# Patient Record
Sex: FEMALE | Race: WHITE | Employment: UNEMPLOYED | URBAN - METROPOLITAN AREA
[De-identification: names, ages, dates, MRNs, and addresses within clinical notes are randomized per-mention and may not be internally consistent; named-entity substitution may affect disease eponyms.]

---

## 2017-02-23 ENCOUNTER — OFFICE VISIT (OUTPATIENT)
Dept: FAMILY MEDICINE CLINIC | Age: 52
End: 2017-02-23

## 2017-02-23 VITALS
WEIGHT: 152 LBS | OXYGEN SATURATION: 98 % | HEART RATE: 71 BPM | HEIGHT: 66 IN | SYSTOLIC BLOOD PRESSURE: 122 MMHG | BODY MASS INDEX: 24.43 KG/M2 | DIASTOLIC BLOOD PRESSURE: 76 MMHG | RESPIRATION RATE: 16 BRPM | TEMPERATURE: 98.6 F

## 2017-02-23 DIAGNOSIS — F31.2 BIPOLAR AFFECTIVE DISORDER, MANIC, SEVERE, WITH PSYCHOTIC BEHAVIOR (HCC): ICD-10-CM

## 2017-02-23 DIAGNOSIS — Z00.00 WELL WOMAN EXAM (NO GYNECOLOGICAL EXAM): Primary | ICD-10-CM

## 2017-02-23 DIAGNOSIS — D72.819 LEUKOPENIA, UNSPECIFIED TYPE: ICD-10-CM

## 2017-02-23 DIAGNOSIS — Z00.00 WELL WOMAN EXAM (NO GYNECOLOGICAL EXAM): ICD-10-CM

## 2017-02-23 RX ORDER — QUETIAPINE FUMARATE 300 MG/1
600 TABLET, FILM COATED ORAL DAILY
Qty: 60 TAB | Refills: 3 | Status: SHIPPED | OUTPATIENT
Start: 2017-02-23 | End: 2017-06-19 | Stop reason: SDUPTHER

## 2017-02-23 NOTE — LETTER
2/28/2017 10:44 AM 
 
Ms. Leyla Arellano [address] [place] [address] Dear Leyla Arellano: 
 
Please find your most recent results below. Resulted Orders CBC WITH AUTOMATED DIFF Result Value Ref Range WBC 4.2 3.4 - 10.8 x10E3/uL  
 RBC 4.50 3.77 - 5.28 x10E6/uL HGB 12.7 11.1 - 15.9 g/dL HCT 38.2 34.0 - 46.6 % MCV 85 79 - 97 fL  
 MCH 28.2 26.6 - 33.0 pg  
 MCHC 33.2 31.5 - 35.7 g/dL  
 RDW 14.8 12.3 - 15.4 % PLATELET 438 827 - 058 x10E3/uL NEUTROPHILS 53 % Lymphocytes 33 % MONOCYTES 12 % EOSINOPHILS 1 % BASOPHILS 1 %  
 ABS. NEUTROPHILS 2.2 1.4 - 7.0 x10E3/uL Abs Lymphocytes 1.4 0.7 - 3.1 x10E3/uL  
 ABS. MONOCYTES 0.5 0.1 - 0.9 x10E3/uL  
 ABS. EOSINOPHILS 0.1 0.0 - 0.4 x10E3/uL  
 ABS. BASOPHILS 0.0 0.0 - 0.2 x10E3/uL IMMATURE GRANULOCYTES 0 %  
 ABS. IMM. GRANS. 0.0 0.0 - 0.1 x10E3/uL Narrative Performed at:  [address]  [id number] : Macy Chinchilla MD, Phone:  [phone] METABOLIC PANEL, COMPREHENSIVE Result Value Ref Range Glucose 90 65 - 99 mg/dL BUN 12 6 - 24 mg/dL Creatinine 0.79 0.57 - 1.00 mg/dL GFR est non-AA 86 >59 mL/min/1.73 GFR est AA 99 >59 mL/min/1.73  
 BUN/Creatinine ratio 15 9 - 23 Sodium 141 134 - 144 mmol/L Potassium 4.8 3.5 - 5.2 mmol/L Chloride 103 96 - 106 mmol/L  
 CO2 25 18 - 29 mmol/L Calcium 9.2 8.7 - 10.2 mg/dL Protein, total 7.0 6.0 - 8.5 g/dL Albumin 4.6 3.5 - 5.5 g/dL GLOBULIN, TOTAL 2.4 1.5 - 4.5 g/dL A-G Ratio 1.9 1.1 - 2.5 Comment: **Effective March 13, 2017 the reference interval** 
  for A/G Ratio will be changing to: Age                Male          Female 0 -  7 days       1.1 - 2.3       1.1 - 2.3 
          8 - 30 days       1.2 - 2.8       1.2 - 2.8 
          1 -  6 months     1.3 - 3.6       1.3 - 3.6 
   7 months -  5 years      1.5 - 2.6       1.5 - 2.6 > 5 years      1.2 - 2.2       1.2 - 2.2 Bilirubin, total <0.2 0.0 - 1.2 mg/dL Alk. phosphatase 63 39 - 117 IU/L  
 AST (SGOT) 15 0 - 40 IU/L  
 ALT (SGPT) 15 0 - 32 IU/L Narrative Performed at:  [address]  [id number] : Ping De Oliveira MD, Phone:  [phone] LIPID PANEL Result Value Ref Range Cholesterol, total 196 100 - 199 mg/dL Triglyceride 81 0 - 149 mg/dL HDL Cholesterol 75 >39 mg/dL VLDL, calculated 16 5 - 40 mg/dL LDL, calculated 105 (H) 0 - 99 mg/dL Narrative Performed at:  [address]  [id number] : Ping De Oliveira MD, Phone:  [phone] CVD REPORT Result Value Ref Range INTERPRETATION Note Comment:  
   Supplement report is available. Narrative Performed at:  [address] [place] [address]  [id number] : Margarita Story PhD, Phone:  [phone] TSH 3RD GENERATION Result Value Ref Range TSH 1.040 0.450 - 4.500 uIU/mL Narrative Performed at:  [address]  [id number] : Ping De Oliveira MD, Phone:  [phone] SPECIMEN STATUS REPORT Result Value Ref Range SPECIMEN STATUS REPORT COMMENT Comment:  
   Written Authorization Written Authorization Written Authorization Received. Authorization received from [address] [place] [address] 02- Logged by Debbi Amato Narrative Performed at:  [address]  [id number] : Ping De Oliveira MD, Phone:  [phone] RECOMMENDATIONS: 
None. Keep up the good work! All labs normal 
 
Please call me if you have any questions: [phone] Sincerely, Lady Raine MD

## 2017-02-23 NOTE — PROGRESS NOTES
Presents for annual exam and medication refill    Had endometriosis  Had both ovaries removed by Dr. Rufus Castellanos a while back    Uterus still intact  No menses    No breast problems  Declines to have mammogram done after discussion    Has frequent constipation -- thinks that it may be a side effect to seroquel  Discussed use of miralax instead of stimulant   Also recommended that she used colace twice daily    Requested that we refill her seroquel as she states that it has been the same dosage for years -- agreed to do this as long as no change in dosage      Monitoring Parameters for seroquel from [facility]:  Mental status; vital signs (as clinically indicated); blood pressure (baseline; then yearly); weight, height, BMI; CBC (yearly); electrolytes and liver function (annually and as clinically indicated); TSH, free T4, and thyroid clinical assessment (baseline and follow-up); fasting plasma glucose level/HbA1c (baseline; then yearly); fasting lipid panel (baseline; then yearly); abnormal involuntary movements or parkinsonian signs (baseline; after dose increase); tardive dyskinesia (every 12 months; high-risk patients every 6 months); lens examination, such as a slit-lamp exam, and experts suggest it may be reasonable to inquire yearly about visual changes and perform ocular examinations yearly in patients >40 years(ADA 2004; Bhupinder Good 2004; Neto 2004). Also has bilateral knee pain -- concerned that it may be osteoarthritis, but states that it has not limited her activities; advised that xray could be done but declined at this time      Subjective:   46 y.o. female for Well Woman Check.        Patient Active Problem List   Diagnosis Code    Bipolar affective disorder, manic, severe, with psychotic behavior ([facility] F31.2    Non-compliance with treatment Z91.19    Leukopenia D72.819    Mammogram declined Z53.20    Family history of heart disease in male family member before age 54 Z80.55     Current Outpatient Prescriptions Medication Sig Dispense Refill    HERBAL DRUGS (COLON HERBAL CLEANSER PO) Take  by mouth.  QUEtiapine (SEROQUEL) 300 mg tablet Take 2 Tabs by mouth daily. Indications: BIPOLAR DISORDER IN REMISSION 60 Tab 3     Allergies   Allergen Reactions    Pcn [Penicillins] Unknown (comments)    Penicillin G Rash        Lab Results  Component Value Date/Time   WBC 3.6 02/01/2016 08:56 AM   HGB 11.9 02/01/2016 08:56 AM   HCT 34.8 02/01/2016 08:56 AM   PLATELET 305 43/66/1951 08:56 AM   MCV 86 02/01/2016 08:56 AM       Lab Results  Component Value Date/Time   Cholesterol, total 208 02/01/2016 08:56 AM   HDL Cholesterol 71 02/01/2016 08:56 AM   LDL, calculated 123 02/01/2016 08:56 AM   Triglyceride 71 02/01/2016 08:56 AM   CHOL/HDL Ratio 2.3 02/03/2015 06:02 AM       Lab Results  Component Value Date/Time   ALT (SGPT) 47 02/02/2015 02:40 PM   AST (SGOT) 37 02/02/2015 02:40 PM   Alk. phosphatase 82 02/02/2015 02:40 PM   Bilirubin, direct 0.1 12/08/2014 06:00 AM   Bilirubin, total 0.5 02/02/2015 02:40 PM       Lab Results  Component Value Date/Time   TSH 0.92 02/02/2015 02:40 PM         ROS: Feeling generally well. No headaches, no dysphagia. No prolonged cough. No dyspnea or chest pain on exertion. No abdominal pain, change in bowel habits, black or bloody stools. No urinary tract symptoms. No new or unusual musculoskeletal symptoms. Specific concerns today: as addressed above    Objective: The patient appears well, alert, oriented x 3, in no distress. Visit Vitals    /76    Pulse 71    Temp 98.6 °F (37 °C) (Oral)    Resp 16    Ht 5' 6\" (1.676 m)    Wt 152 lb (68.9 kg)    SpO2 98%    BMI 24.53 kg/m2     ENT normal.  Neck supple. No adenopathy or thyromegaly. YOVANNY. Lungs are clear, good air entry, no wheezes, rhonchi or rales. S1 and S2 normal, no murmurs, regular rate and rhythm. Abdomen soft without tenderness, guarding, mass or organomegaly. Extremities show no edema, normal peripheral pulses. Neurological is normal, no focal findings. Assessment/Plan:   Well Woman  routine labs ordered, call if any problems    ICD-10-CM ICD-9-CM    1. Well woman exam (no gynecological exam) B96.35 R59.3 METABOLIC PANEL, COMPREHENSIVE      LIPID PANEL   2. Leukopenia, unspecified type D72.819 288.50 CBC WITH AUTOMATED DIFF   3. Bipolar affective disorder, manic, severe, with psychotic behavior ([facility] F31.2 296.44 QUEtiapine (SEROQUEL) 300 mg tablet   4.  Well woman exam (no gynecological exam) G70.93 Z61.4 METABOLIC PANEL, COMPREHENSIVE      LIPID PANEL    [V70.0]

## 2017-02-23 NOTE — PROGRESS NOTES
Chief Complaint   Patient presents with    Complete Physical     c/o constipation    Referral Request     Psychiatrist/Bipolar depression    Knee Pain     bilateral     1. Have you been to the ER, urgent care clinic since your last visit? Hospitalized since your last visit? No    2. Have you seen or consulted any other health care providers outside of the [facility] since your last visit? Include any pap smears or colon screening.  No

## 2017-02-24 LAB
ALBUMIN SERPL-MCNC: 4.6 G/DL (ref 3.5–5.5)
ALBUMIN/GLOB SERPL: 1.9 {RATIO} (ref 1.1–2.5)
ALP SERPL-CCNC: 63 IU/L (ref 39–117)
ALT SERPL-CCNC: 15 IU/L (ref 0–32)
AST SERPL-CCNC: 15 IU/L (ref 0–40)
BASOPHILS # BLD AUTO: 0 X10E3/UL (ref 0–0.2)
BASOPHILS NFR BLD AUTO: 1 %
BILIRUB SERPL-MCNC: <0.2 MG/DL (ref 0–1.2)
BUN SERPL-MCNC: 12 MG/DL (ref 6–24)
BUN/CREAT SERPL: 15 (ref 9–23)
CALCIUM SERPL-MCNC: 9.2 MG/DL (ref 8.7–10.2)
CHLORIDE SERPL-SCNC: 103 MMOL/L (ref 96–106)
CHOLEST SERPL-MCNC: 196 MG/DL (ref 100–199)
CO2 SERPL-SCNC: 25 MMOL/L (ref 18–29)
CREAT SERPL-MCNC: 0.79 MG/DL (ref 0.57–1)
EOSINOPHIL # BLD AUTO: 0.1 X10E3/UL (ref 0–0.4)
EOSINOPHIL NFR BLD AUTO: 1 %
ERYTHROCYTE [DISTWIDTH] IN BLOOD BY AUTOMATED COUNT: 14.8 % (ref 12.3–15.4)
GLOBULIN SER CALC-MCNC: 2.4 G/DL (ref 1.5–4.5)
GLUCOSE SERPL-MCNC: 90 MG/DL (ref 65–99)
HCT VFR BLD AUTO: 38.2 % (ref 34–46.6)
HDLC SERPL-MCNC: 75 MG/DL
HGB BLD-MCNC: 12.7 G/DL (ref 11.1–15.9)
IMM GRANULOCYTES # BLD: 0 X10E3/UL (ref 0–0.1)
IMM GRANULOCYTES NFR BLD: 0 %
INTERPRETATION, 910389: NORMAL
LDLC SERPL CALC-MCNC: 105 MG/DL (ref 0–99)
LYMPHOCYTES # BLD AUTO: 1.4 X10E3/UL (ref 0.7–3.1)
LYMPHOCYTES NFR BLD AUTO: 33 %
MCH RBC QN AUTO: 28.2 PG (ref 26.6–33)
MCHC RBC AUTO-ENTMCNC: 33.2 G/DL (ref 31.5–35.7)
MCV RBC AUTO: 85 FL (ref 79–97)
MONOCYTES # BLD AUTO: 0.5 X10E3/UL (ref 0.1–0.9)
MONOCYTES NFR BLD AUTO: 12 %
NEUTROPHILS # BLD AUTO: 2.2 X10E3/UL (ref 1.4–7)
NEUTROPHILS NFR BLD AUTO: 53 %
PLATELET # BLD AUTO: 270 X10E3/UL (ref 150–379)
POTASSIUM SERPL-SCNC: 4.8 MMOL/L (ref 3.5–5.2)
PROT SERPL-MCNC: 7 G/DL (ref 6–8.5)
RBC # BLD AUTO: 4.5 X10E6/UL (ref 3.77–5.28)
SODIUM SERPL-SCNC: 141 MMOL/L (ref 134–144)
SPECIMEN STATUS REPORT, ROLRST: NORMAL
TRIGL SERPL-MCNC: 81 MG/DL (ref 0–149)
TSH SERPL DL<=0.005 MIU/L-ACNC: 1.04 UIU/ML (ref 0.45–4.5)
VLDLC SERPL CALC-MCNC: 16 MG/DL (ref 5–40)
WBC # BLD AUTO: 4.2 X10E3/UL (ref 3.4–10.8)

## 2017-06-19 DIAGNOSIS — F31.2 BIPOLAR AFFECTIVE DISORDER, MANIC, SEVERE, WITH PSYCHOTIC BEHAVIOR (HCC): ICD-10-CM

## 2017-06-22 RX ORDER — QUETIAPINE FUMARATE 300 MG/1
TABLET, FILM COATED ORAL
Qty: 60 TAB | Refills: 3 | Status: SHIPPED | OUTPATIENT
Start: 2017-06-22 | End: 2017-08-21 | Stop reason: SDUPTHER

## 2017-08-21 DIAGNOSIS — F31.2 BIPOLAR AFFECTIVE DISORDER, MANIC, SEVERE, WITH PSYCHOTIC BEHAVIOR (HCC): ICD-10-CM

## 2017-08-22 RX ORDER — QUETIAPINE FUMARATE 300 MG/1
TABLET, FILM COATED ORAL
Qty: 60 TAB | Refills: 3 | Status: SHIPPED | OUTPATIENT
Start: 2017-08-22 | End: 2018-02-08 | Stop reason: SDUPTHER

## 2017-08-24 ENCOUNTER — TELEPHONE (OUTPATIENT)
Dept: FAMILY MEDICINE CLINIC | Age: 52
End: 2017-08-24

## 2017-08-24 NOTE — TELEPHONE ENCOUNTER
Letter rec'd from [facility] to inform the physician that the patient has gaps in care.

## 2018-03-09 DIAGNOSIS — F31.2 BIPOLAR AFFECTIVE DISORDER, MANIC, SEVERE, WITH PSYCHOTIC BEHAVIOR (HCC): ICD-10-CM

## 2018-03-09 RX ORDER — QUETIAPINE FUMARATE 300 MG/1
TABLET, FILM COATED ORAL
Qty: 60 TAB | Refills: 0 | OUTPATIENT
Start: 2018-03-09

## 2019-12-09 ENCOUNTER — HOSPITAL ENCOUNTER (OUTPATIENT)
Dept: LAB | Age: 54
Discharge: HOME | End: 2019-12-09

## 2019-12-09 ENCOUNTER — OFFICE VISIT (OUTPATIENT)
Dept: FAMILY MEDICINE CLINIC | Age: 54
End: 2019-12-09

## 2019-12-09 VITALS
TEMPERATURE: 98.1 F | RESPIRATION RATE: 18 BRPM | OXYGEN SATURATION: 100 % | BODY MASS INDEX: 24.59 KG/M2 | HEART RATE: 60 BPM | SYSTOLIC BLOOD PRESSURE: 122 MMHG | WEIGHT: 153 LBS | DIASTOLIC BLOOD PRESSURE: 79 MMHG | HEIGHT: 66 IN

## 2019-12-09 DIAGNOSIS — E78.41 ELEVATED LIPOPROTEIN(A): ICD-10-CM

## 2019-12-09 DIAGNOSIS — Z82.49 FAMILY HISTORY OF HEART DISEASE IN MALE FAMILY MEMBER BEFORE AGE 55: ICD-10-CM

## 2019-12-09 DIAGNOSIS — F31.2 BIPOLAR AFFECTIVE DISORDER, MANIC, SEVERE, WITH PSYCHOTIC BEHAVIOR (HCC): ICD-10-CM

## 2019-12-09 DIAGNOSIS — E78.41 ELEVATED LIPOPROTEIN(A): Primary | ICD-10-CM

## 2019-12-09 LAB
CHOLEST SERPL-MCNC: 214 MG/DL
HDLC SERPL-MCNC: 78 MG/DL
HDLC SERPL: 2.7 {RATIO} (ref 0–5)
LDLC SERPL CALC-MCNC: 120 MG/DL (ref 0–100)
LIPID PROFILE,FLP: ABNORMAL
TRIGL SERPL-MCNC: 80 MG/DL (ref ?–150)
VLDLC SERPL CALC-MCNC: 16 MG/DL

## 2019-12-09 RX ORDER — LORAZEPAM 1 MG/1
1 TABLET ORAL AS NEEDED
COMMUNITY
Start: 2018-04-16

## 2019-12-09 NOTE — PATIENT INSTRUCTIONS

## 2019-12-09 NOTE — PROGRESS NOTES
Identified pt with two pt identifiers(name and ). Reviewed record in preparation for visit and have obtained necessary documentation. Chief Complaint   Patient presents with    Follow Up Chronic Condition        Health Maintenance Due   Topic    DTaP/Tdap/Td series (1 - Tdap)    Shingrix Vaccine Age 50> (1 of 2)    BREAST CANCER SCRN MAMMOGRAM     Influenza Age 5 to Adult     MEDICARE YEARLY EXAM        Visit Vitals  /79 (BP 1 Location: Right arm, BP Patient Position: Sitting)   Pulse 60   Temp 98.1 °F (36.7 °C) (Oral)   Resp 18   Ht 5' 6\" (1.676 m)   Wt 153 lb (69.4 kg)   SpO2 100%   BMI 24.69 kg/m²         Coordination of Care Questionnaire:  :   1) Have you been to an emergency room, urgent care, or hospitalized since your last visit? If yes, where when, and reason for visit? yes When: 19 Where: [facility] Reason: physical      2. Have seen or consulted any other health care provider since your last visit? If yes, where when, and reason for visit? Yes, When: 3/20/18 Where: [facility] Reason: Psych concerns      Patient states last Pap 2019 completed at [facility], patient declined influenza vaccine, Patient states she had a colonoscopy at age 46. Patient states she received Tdap about 2 years ago. 3) Do you have an Advanced Directive/ Living Will in place? NO  If no, would you like information NO    Patient is accompanied by self I have received verbal consent from April Pelletier to discuss any/all medical information while they are present in the room.

## 2019-12-09 NOTE — PROGRESS NOTES
History of Present Illness:     Chief Complaint   Patient presents with    Follow Up Chronic Condition     Pt is a 62y.o. year old female    Presents to clinic for re-establish care. [facility] at [facility]  Labs notable for LDL cholesterol 149    Bipolar disorder  Managed on Seroquel and Ativan. Followed by Psych; Dr. Talia Henderson ([address] Also followed by psychologist      Followed by GYN for mammogram and pap. UTD on her pap and mammo; normal per patient  [address]  Early heart disease in father (MI at 46), brother (MI at 62)  Trying to eat more plant based diet  Taking Fish Oil      Past Medical History:   Diagnosis Date    Aggressive outburst     Anxiety disorder     Artificial menopause     B[facility] ([facility] 2007    Endometriosis     Ill-defined condition     Leukopenia     Psychotic disorder ([facility]     Substance abuse ([facility]          Current Outpatient Medications on File Prior to Visit   Medication Sig Dispense Refill    LORazepam (ATIVAN) 1 mg tablet Take 1 mg by mouth as needed.  QUEtiapine (SEROQUEL) 300 mg tablet TAKE 2 TABLETS BY MOUTH DAILY (Patient taking differently: 100 mg daily.) 60 Tab 0    HERBAL DRUGS (COLON HERBAL CLEANSER PO) Take  by mouth. No current facility-administered medications on file prior to visit. Allergies:   Allergies   Allergen Reactions    Pcn [Penicillins] Unknown (comments)    Penicillin G Rash     Family History   Problem Relation Age of Onset    Other Mother         clotting disorder    Heart Attack Father     Hypertension Father     Hypertension Sister     High Cholesterol Brother     Cancer Maternal Grandmother         lung    Alzheimer Paternal Grandmother     Cancer Paternal Grandfather         lung     Social History     Socioeconomic History    Marital status:      Spouse name: Not on file    Number of children: Not on file    Years of education: Not on file    Highest education level: Not on file   Occupational History    Not on file   Social Needs    Financial resource strain: Not on file    Food insecurity:     Worry: Not on file     Inability: Not on file    Transportation needs:     Medical: Not on file     Non-medical: Not on file   Tobacco Use    Smoking status: Never Smoker    Smokeless tobacco: Never Used   Substance and Sexual Activity    Alcohol use: Yes     Comment: social    Drug use: No    Sexual activity: Yes     Partners: Female     Birth control/protection: Surgical   Lifestyle    Physical activity:     Days per week: Not on file     Minutes per session: Not on file    Stress: Not on file   Relationships    Social connections:     Talks on phone: Not on file     Gets together: Not on file     Attends Latter day service: Not on file     Active member of club or organization: Not on file     Attends meetings of clubs or organizations: Not on file     Relationship status: Not on file    Intimate partner violence:     Fear of current or ex partner: Not on file     Emotionally abused: Not on file     Physically abused: Not on file     Forced sexual activity: Not on file   Other Topics Concern    Not on file   Social History Narrative    Not on file         Review of Systems:  Denies fever, chills, sweats  Denies chest pain, LEE, palpitations, LE edema  Denies cough, sputum production, SOB, pleuritic chest pain, wheezing      Objective:     Vitals:    12/09/19 0803   BP: 122/79   Pulse: 60   Resp: 18   Temp: 98.1 °F (36.7 °C)   TempSrc: Oral   SpO2: 100%   Weight: 153 lb (69.4 kg)   Height: 5' 6\" (1.676 m)       Physical Exam:  General appearance - alert, well appearing, and in no distress  Neck - supple, no significant adenopathy  Chest - clear to auscultation, no wheezes, rales or rhonchi, symmetric air entry  Heart - normal rate, regular rhythm, normal S1, S2, no murmurs, rubs, clicks or gallops  Neurological - alert, oriented, normal speech, no focal findings or movement disorder noted  Extremities - no pedal edema, no clubbing or cyanosis      Recent Labs:  No results found for this or any previous visit (from the past 12 hour(s)). Assessment and Plan:   Pt is a 62y.o. year old female,      ICD-10-CM ICD-9-CM    1. Elevated lipoprotein(a) E78.41 272.8 LIPID PANEL   2. Bipolar affective disorder, manic, severe, with psychotic behavior ([facility] F31.2 296.44    3. Family history of heart disease in male family member before age 54 Z80.52 V15.46      Reviewed current problems  Re-check lipid panel today    Bipolar d/o stable  Followed by Psych    HLD  Recheck lipid panel today  Pt recently changed to plant based diet    Follow up in 6 months for cholesterol check    Selene Reyes MD      I have discussed the diagnosis with the patient and the intended plan as seen in the above orders. The patient has received an after-visit summary and questions were answered concerning future plans.

## 2019-12-10 NOTE — PROGRESS NOTES
(down from 149 on prior check)  Low ASCVD risk    Please call and notify patient that her LDL is trending down   on yesterday's check

## 2019-12-11 ENCOUNTER — TELEPHONE (OUTPATIENT)
Dept: FAMILY MEDICINE CLINIC | Age: 54
End: 2019-12-11

## 2019-12-11 NOTE — TELEPHONE ENCOUNTER
Patient notify about  trending down and no further actions required at this time.     ----- Message from Alia Medeiros MD sent at 12/10/2019 11:05 AM EST -----   (down from 149 on prior check)  Low ASCVD risk    Please call and notify patient that her LDL is trending down   on yesterday's check

## 2020-09-25 NOTE — TELEPHONE ENCOUNTER
Patient is requesting a refill on  Requested Prescriptions     Pending Prescriptions Disp Refills    QUEtiapine (SEROQUEL) 300 mg tablet 60 Tab 3     Thank you Results send via my [employer].

## 2021-01-18 ENCOUNTER — TELEPHONE (OUTPATIENT)
Dept: FAMILY MEDICINE CLINIC | Age: 56
End: 2021-01-18

## 2021-01-19 ENCOUNTER — OFFICE VISIT (OUTPATIENT)
Dept: URGENT CARE | Age: 56
End: 2021-01-19
Payer: COMMERCIAL

## 2021-01-19 VITALS — TEMPERATURE: 98.9 F | OXYGEN SATURATION: 99 % | HEART RATE: 86 BPM | RESPIRATION RATE: 14 BRPM

## 2021-01-19 DIAGNOSIS — Z20.822 ENCOUNTER FOR SCREENING LABORATORY TESTING FOR COVID-19 VIRUS IN ASYMPTOMATIC PATIENT: Primary | ICD-10-CM

## 2021-01-19 PROCEDURE — 99202 OFFICE O/P NEW SF 15 MIN: CPT | Performed by: FAMILY MEDICINE

## 2021-01-19 NOTE — LETTER
January 19, 2021 Petra Sullivan [address] [facility] [address] Dear Thee Anchors: 
Thank you for requesting access to [employer]ca. Please follow the instructions below to securely access and download your online medical record. [employer]ca allows you to send messages to your doctor, view your test results, renew your prescriptions, schedule appointments, and more. How Do I Sign Up? 1. In your internet browser, go to [email or web address]. 2. Click on the First Time User? Click Here link in the Sign In box. You will see the New Member Sign Up page. 3. Enter your t[facility]ca Access Code exactly as it appears below. You will not need to use this code after youve completed the sign-up process. If you do not sign up before the expiration date, you must request a new code. tamyca Access Code: [email or web address] Expires: 3/5/2021  9:05 AM  
 
4. Enter the last four digits of your Social Security Number (xxxx) and Date of Birth (mm/dd/yyyy) as indicated and click Submit. You will be taken to the next sign-up page. 5. Create a tamyca ID. This will be your tamyca login ID and cannot be changed, so think of one that is secure and easy to remember. 6. Create a t[employer]ca password. You can change your password at any time. 7. Enter your Password Reset Question and Answer. This can be used at a later time if you forget your password. 8. Enter your e-mail address. You will receive e-mail notification when new information is available in [address] 9. Click Sign Up. You can now view and download portions of your medical record. 10. Click the Download Summary menu link to download a portable copy of your medical information. Additional Information If you have questions, please visit the Frequently Asked Questions section of the tamyca website at [email or web address] Remember, tamyca is NOT to be used for urgent needs. For medical emergencies, dial 911. Now available from your iPhone and Android! Sincerely, [employer]

## 2021-01-19 NOTE — PROGRESS NOTES
This patient was seen at [address] [place] [address] Urgent Care while in their vehicle due to COVID-19 pandemic with PPE and focused examination in order to decrease community viral transmission. The patient/guardian gave verbal consent to treat. Lionel Gillette is a 60 y.o. female who presents for COVID-19 testing. No known direct exposure to COVID-19. Recently travelled from [facility]. Denies cough, fever, SOB. The history is provided by the patient.         Past Medical History:   Diagnosis Date    Aggressive outburst     Anxiety disorder     Artificial menopause     Bipolar affective disorder ([facility] 2007    Endometriosis     Ill-defined condition     Leukopenia     Psychotic disorder ([facility]     Substance abuse ([facility]         Past Surgical History:   Procedure Laterality Date    HX ORTHOPAEDIC      HX SALPINGO-OOPHORECTOMY  2009    bilateral         Family History   Problem Relation Age of Onset    Other Mother         clotting disorder    Heart Attack Father     Hypertension Father     Hypertension Sister     High Cholesterol Brother     Cancer Maternal Grandmother         lung    Alzheimer Paternal Grandmother     Cancer Paternal Grandfather         lung        Social History     Socioeconomic History    Marital status:      Spouse name: Not on file    Number of children: Not on file    Years of education: Not on file    Highest education level: Not on file   Occupational History    Not on file   Social Needs    Financial resource strain: Not on file    Food insecurity     Worry: Not on file     Inability: Not on file    Transportation needs     Medical: Not on file     Non-medical: Not on file   Tobacco Use    Smoking status: Never Smoker    Smokeless tobacco: Never Used   Substance and Sexual Activity    Alcohol use: Yes     Comment: social    Drug use: No    Sexual activity: Yes     Partners: Female     Birth control/protection: Surgical   Lifestyle    Physical activity Days per week: Not on file     Minutes per session: Not on file    Stress: Not on file   Relationships    Social connections     Talks on phone: Not on file     Gets together: Not on file     Attends Caodaism service: Not on file     Active member of club or organization: Not on file     Attends meetings of clubs or organizations: Not on file     Relationship status: Not on file    Intimate partner violence     Fear of current or ex partner: Not on file     Emotionally abused: Not on file     Physically abused: Not on file     Forced sexual activity: Not on file   Other Topics Concern    Not on file   Social History Narrative    Not on file                ALLERGIES: Pcn [penicillins] and Penicillin g    Review of Systems   Constitutional: Negative for activity change, appetite change, chills and fever. HENT: Negative for congestion, rhinorrhea and sore throat. Respiratory: Negative for cough, shortness of breath and wheezing. Cardiovascular: Negative for chest pain. Gastrointestinal: Negative for abdominal pain, diarrhea, nausea and vomiting. Musculoskeletal: Negative for myalgias. Neurological: Negative for headaches. Vitals:    01/19/21 0929   Pulse: 86   Resp: 14   Temp: 98.9 °F (37.2 °C)   SpO2: 99%       Physical Exam  Vitals signs and nursing note reviewed. Constitutional:       General: She is not in acute distress. Appearance: She is well-developed. She is not diaphoretic. Pulmonary:      Effort: Pulmonary effort is normal.   Neurological:      Mental Status: She is alert. Psychiatric:         Behavior: Behavior normal.         Thought Content: Thought content normal.         Judgment: Judgment normal.         MDM    ICD-10-CM ICD-9-CM   1.  Encounter for screening laboratory testing for COVID-19 virus in asymptomatic patient  Z20.822 V01.79       Orders Placed This Encounter    NOVEL CORONAVIRUS (COVID-19)     Scheduling Instructions:      1) Due to current limited availability of the COVID-19 PCR test, tests will be prioritized and may not be completed.              2) Order only if the test result will change clinical management or necessary for a return to mission-critical employment decision.              3) Print and instruct patient to adhere to CDC home isolation program. (Link Above)              4) Set up or refer patient for a monitoring program.              5) Have patient sign up for and leverage MyChart (if not previously done). Order Specific Question:   Is this test for diagnosis or screening? Answer:   Screening     Order Specific Question:   Symptomatic for COVID-19 as defined by CDC? Answer:   No     Order Specific Question:   Hospitalized for COVID-19? Answer:   No     Order Specific Question:   Admitted to ICU for COVID-19? Answer:   No     Order Specific Question:   Employed in healthcare setting? Answer:   No     Order Specific Question:   Resident in a congregate (group) care setting? Answer:   No     Order Specific Question:   Pregnant? Answer:   No     Order Specific Question:   Previously tested for COVID-19? Answer:   No        Mask in public  Maintain Social distancing  Await results    If signs and symptoms become worse the pt is to go to the ER.          Procedures

## 2021-01-21 LAB — SARS-COV-2, NAA: NOT DETECTED

## 2021-10-20 ENCOUNTER — OFFICE VISIT (OUTPATIENT)
Dept: FAMILY MEDICINE CLINIC | Age: 56
End: 2021-10-20
Payer: MEDICARE

## 2021-10-20 ENCOUNTER — NURSE TRIAGE (OUTPATIENT)
Dept: OTHER | Facility: CLINIC | Age: 56
End: 2021-10-20

## 2021-10-20 VITALS
BODY MASS INDEX: 26.03 KG/M2 | HEART RATE: 65 BPM | TEMPERATURE: 98.5 F | WEIGHT: 162 LBS | HEIGHT: 66 IN | RESPIRATION RATE: 18 BRPM | SYSTOLIC BLOOD PRESSURE: 136 MMHG | DIASTOLIC BLOOD PRESSURE: 79 MMHG | OXYGEN SATURATION: 98 %

## 2021-10-20 DIAGNOSIS — M54.50 ACUTE MIDLINE LOW BACK PAIN WITHOUT SCIATICA: Primary | ICD-10-CM

## 2021-10-20 DIAGNOSIS — R35.0 URINARY FREQUENCY: ICD-10-CM

## 2021-10-20 LAB
BILIRUB UR QL STRIP: NEGATIVE
GLUCOSE UR-MCNC: NEGATIVE MG/DL
KETONES P FAST UR STRIP-MCNC: NEGATIVE MG/DL
PH UR STRIP: 6 [PH] (ref 4.6–8)
PROT UR QL STRIP: NEGATIVE
SP GR UR STRIP: 1.01 (ref 1–1.03)
UA UROBILINOGEN AMB POC: NORMAL (ref 0.2–1)
URINALYSIS CLARITY POC: CLEAR
URINALYSIS COLOR POC: YELLOW
URINE BLOOD POC: NORMAL
URINE LEUKOCYTES POC: NEGATIVE
URINE NITRITES POC: NEGATIVE

## 2021-10-20 PROCEDURE — 99213 OFFICE O/P EST LOW 20 MIN: CPT | Performed by: STUDENT IN AN ORGANIZED HEALTH CARE EDUCATION/TRAINING PROGRAM

## 2021-10-20 PROCEDURE — 81003 URINALYSIS AUTO W/O SCOPE: CPT | Performed by: STUDENT IN AN ORGANIZED HEALTH CARE EDUCATION/TRAINING PROGRAM

## 2021-10-20 RX ORDER — LINACLOTIDE 290 UG/1
CAPSULE, GELATIN COATED ORAL
COMMUNITY
Start: 2021-08-25 | End: 2022-08-31

## 2021-10-20 NOTE — PROGRESS NOTES
Chief Complaint   Patient presents with    LOW BACK PAIN     Back pain started about 3 days ago with chronic urinary frequency, patient going out of town and wants to be sure she is not getting a UTI. Subjective  Alejandro Haddad is an 60 y.o. female with a medical history of anxiety and bipolar who presents with back pain. Patient reports persistent lower back pain that began 3 days ago. It came randomly. It is located in the center of her lower back and it feels sharp. It is persistent and she has trouble sleeping. The pain is worse at night and sometimes wakes her up. She reports a 10lb weight gain. She is very active and does not think this pain is muscular in nature as it feels different. She has tried NSAIDs prn and notes that it helps. Her las colonoscopy was 8 years ago and was normal. Her last pap was 3 years ago and wnl   She admits to urinary frequency however this has been presents for many years. She also notes that she has a hx of chronic hematuria and was told it was normal. Her last BM was 6 days ago. She denies any fever, chills, nausea, vomitng, night sweats, incontinence, weakness, numbness, or dysuria. Allergies - reviewed: Allergies   Allergen Reactions    Pcn [Penicillins] Unknown (comments)    Penicillin G Rash         Medications - reviewed:   Current Outpatient Medications   Medication Sig    LORazepam (ATIVAN) 1 mg tablet Take 1 mg by mouth as needed.  QUEtiapine (SEROQUEL) 300 mg tablet TAKE 2 TABLETS BY MOUTH DAILY (Patient taking differently: 100 mg daily.)    Linzess 290 mcg cap capsule     HERBAL DRUGS (COLON HERBAL CLEANSER PO) Take  by mouth. (Patient not taking: Reported on 10/20/2021)     No current facility-administered medications for this visit.          Past Medical History - reviewed:  Past Medical History:   Diagnosis Date    Aggressive outburst     Anxiety disorder     Artificial menopause     Bipolar affective disorder ([facility] 2007    Endometriosis     Ill-defined condition     Leukopenia     Psychotic disorder (HCC)     Substance abuse (HCC)          Past Surgical History - reviewed:   Past Surgical History:   Procedure Laterality Date    HX ORTHOPAEDIC      HX SALPINGO-OOPHORECTOMY  2009    bilateral         Social History - reviewed:  Social History     Socioeconomic History    Marital status:      Spouse name: Not on file    Number of children: Not on file    Years of education: Not on file    Highest education level: Not on file   Occupational History    Not on file   Tobacco Use    Smoking status: Never Smoker    Smokeless tobacco: Never Used   Vaping Use    Vaping Use: Never used   Substance and Sexual Activity    Alcohol use: Yes     Comment: social    Drug use: No    Sexual activity: Yes     Partners: Female     Birth control/protection: Surgical   Other Topics Concern    Not on file   Social History Narrative    Not on file     Social Determinants of Health     Financial Resource Strain:     Difficulty of Paying Living Expenses:    Food Insecurity:     Worried About Running Out of Food in the Last Year:     Ran Out of Food in the Last Year:    Transportation Needs:     Lack of Transportation (Medical):      Lack of Transportation (Non-Medical):    Physical Activity:     Days of Exercise per Week:     Minutes of Exercise per Session:    Stress:     Feeling of Stress :    Social Connections:     Frequency of Communication with Friends and Family:     Frequency of Social Gatherings with Friends and Family:     Attends Christian Services:     Active Member of Clubs or Organizations:     Attends Club or Organization Meetings:     Marital Status:    Intimate Partner Violence:     Fear of Current or Ex-Partner:     Emotionally Abused:     Physically Abused:     Sexually Abused:          Family History - reviewed:  Family History   Problem Relation Age of Onset    Other Mother         clotting disorder    Heart Attack Father     Hypertension Father     Hypertension Sister     High Cholesterol Brother     Cancer Maternal Grandmother         lung    Alzheimer Paternal Grandmother     Cancer Paternal Grandfather         lung         Immunizations - reviewed:   Immunization History   Administered Date(s) Administered    COVID-19, PFIZER, MRNA, LNP-S, PF, 30MCG/0.3ML DOSE 03/24/2021, 04/14/2021    Tdap 01/01/2018           ROS  Review of Systems   Constitutional: Negative for chills, fever, malaise/fatigue and weight loss. Respiratory: Negative for cough and shortness of breath. Gastrointestinal: Negative for abdominal pain, nausea and vomiting. Genitourinary: Positive for frequency and hematuria. Negative for dysuria, flank pain and urgency. Musculoskeletal: Positive for back pain. Negative for joint pain. Neurological: Negative for sensory change, focal weakness and weakness. Physical Exam  Visit Vitals  /79 (BP 1 Location: Right arm, BP Patient Position: Sitting, BP Cuff Size: Adult)   Pulse 65   Temp 98.5 °F (36.9 °C) (Oral)   Resp 18   Ht 5' 6\" (1.676 m)   Wt 162 lb (73.5 kg)   SpO2 98%   BMI 26.15 kg/m²       Physical Exam  HENT:      Head: Normocephalic and atraumatic. Cardiovascular:      Rate and Rhythm: Normal rate. Heart sounds: No murmur heard. Pulmonary:      Effort: Pulmonary effort is normal. No respiratory distress. Breath sounds: No wheezing. Abdominal:      General: There is no distension. Palpations: Abdomen is soft. Tenderness: There is no abdominal tenderness. There is no right CVA tenderness or left CVA tenderness. Musculoskeletal:      Lumbar back: No swelling, spasms, tenderness or bony tenderness. Normal range of motion.  Negative right straight leg raise test and negative left straight leg raise test.              Assessment/Plan    60 y.o. female with a PMH of bipolar disorder and anxiety presenting with back pain.        1. Acute midline low back pain without sciatica  -Patient presents with lower back pain x 3 days. Notes the pain is worse at night. Ddx include MSK spasm, bone pathology, mets, or intraabdominal pathology  - Will order imaging to assess for lytic lesions. Patient instructed to use pain control prn for now. If no improvement will need follow up in 4 weeks. At that time will order lab work and possible CT to assess kidneys also. - XR SPINE LUMB MIN 4 V; Future    2. Urinary frequency  - POC UA notable for trace blood. Will culture to rule out UTI as cause of symptoms. Will likely need a repeat UA with micro to assess hematuria  - AMB POC URINALYSIS DIP STICK AUTO W/O MICRO  - CULTURE, URINE; Future  - CULTURE, URINE         Follow-up and Dispositions    · Return in about 4 weeks (around 11/17/2021). I have discussed the diagnosis with the patient and the intended plan as seen in the above orders. Patient verbalized understanding of the plan and agrees with the plan. The patient has received an after-visit summary and questions were answered concerning future plans. I have discussed medication side effects and warnings with the patient as well. Informed patient to return to the office if new symptoms arise.         Ely Guadalupe DO  Family Medicine Resident

## 2021-10-20 NOTE — TELEPHONE ENCOUNTER
Reason for Disposition   Side (flank) or lower back pain present    Answer Assessment - Initial Assessment Questions  1. SYMPTOM: \"What's the main symptom you're concerned about? \" (e.g., frequency, incontinence)      Lower back pain (last 3-4 days), Urinary frequency, bladder may be fallen per pt, darker urine (not cloudy)    2. ONSET: \"When did the  symptoms  start? \"      Onset was 3-4 days ago    3. PAIN: \"Is there any pain? \" If so, ask: \"How bad is it? \" (Scale: 1-10; mild, moderate, severe)      Lower back pain (does not feel like lower back strain - is a different kind of pain) Constant pain at 6/10 - Taking Tylenol and Aleve - seemed to help at first but took last night with no results    4. CAUSE: \"What do you think is causing the symptoms? \"      She thinks it is a UTI    5. OTHER SYMPTOMS: \"Do you have any other symptoms? \" (e.g., fever, flank pain, blood in urine, pain with urination)      Lower back pain, urinary freq, darker urine    6. PREGNANCY: \"Is there any chance you are pregnant? \" \"When was your last menstrual period? \"      No    Protocols used: URINARY SYMPTOMS-ADULT-[place]    . Call came in from [place] at the [facility]    See triage above: Patient believes that she may have uti. No fever. Triage indicates for the patient to be seen today by her provider or in an Urgent Care. Warm transfer to [facility] AT THE [facility] at the [address] [place] [address] for scheduling. Care advice provided:  Intake at least 8-8 oz glasses of water per day. Patient has no further questions.

## 2021-10-21 LAB
BACTERIA SPEC CULT: NORMAL
SERVICE CMNT-IMP: NORMAL

## 2021-10-22 ENCOUNTER — TELEPHONE (OUTPATIENT)
Dept: FAMILY MEDICINE CLINIC | Age: 56
End: 2021-10-22

## 2021-10-22 NOTE — TELEPHONE ENCOUNTER
Called patient to discuss ucx results per her request. Left message to call office back at earliest Raymundo Amaya,

## 2022-05-02 ENCOUNTER — TELEPHONE (OUTPATIENT)
Dept: FAMILY MEDICINE CLINIC | Age: 57
End: 2022-05-02

## 2022-05-02 DIAGNOSIS — M25.569 CHRONIC KNEE PAIN, UNSPECIFIED LATERALITY: Primary | ICD-10-CM

## 2022-05-02 DIAGNOSIS — G89.29 CHRONIC KNEE PAIN, UNSPECIFIED LATERALITY: Primary | ICD-10-CM

## 2022-05-02 NOTE — TELEPHONE ENCOUNTER
Hello, pt called wanted to request a orthopedic referral if possible for her knee issues. She gave me the orthopedic surgeon she wanted to go to:  Dr Sosa Gomez   Fax: [phone] ATTN: Modesto Hernández  Phone: [phone]  Please advice, if she needs an appointment with us first or we can place a referral for her without seeing.  Thank you      -Darrell Lamb

## 2022-05-04 NOTE — TELEPHONE ENCOUNTER
Patient called and said that doctor office did not receive the referral that was palced. I printed the referral and faxed it to the number that was provided to me. [phone]. I also called Lexy Dewey at the doctor office to verify that she received the referral but was unable to reach her. I did call Karl Alanna back to let her know I received the confirmation and that the doctor office should have the referral now.

## 2022-05-24 ENCOUNTER — OFFICE VISIT (OUTPATIENT)
Dept: ORTHOPEDIC SURGERY | Age: 57
End: 2022-05-24
Payer: MEDICARE

## 2022-05-24 VITALS — HEIGHT: 66 IN | WEIGHT: 162 LBS | BODY MASS INDEX: 26.03 KG/M2

## 2022-05-24 DIAGNOSIS — M25.562 CHRONIC PAIN OF BOTH KNEES: Primary | ICD-10-CM

## 2022-05-24 DIAGNOSIS — M17.11 ARTHRITIS OF KNEE, RIGHT: ICD-10-CM

## 2022-05-24 DIAGNOSIS — M17.12 ARTHRITIS OF LEFT KNEE: ICD-10-CM

## 2022-05-24 DIAGNOSIS — M25.561 CHRONIC PAIN OF BOTH KNEES: Primary | ICD-10-CM

## 2022-05-24 DIAGNOSIS — G89.29 CHRONIC PAIN OF BOTH KNEES: Primary | ICD-10-CM

## 2022-05-24 PROCEDURE — 3017F COLORECTAL CA SCREEN DOC REV: CPT | Performed by: ORTHOPAEDIC SURGERY

## 2022-05-24 PROCEDURE — G9717 DOC PT DX DEP/BP F/U NT REQ: HCPCS | Performed by: ORTHOPAEDIC SURGERY

## 2022-05-24 PROCEDURE — G8419 CALC BMI OUT NRM PARAM NOF/U: HCPCS | Performed by: ORTHOPAEDIC SURGERY

## 2022-05-24 PROCEDURE — 99204 OFFICE O/P NEW MOD 45 MIN: CPT | Performed by: ORTHOPAEDIC SURGERY

## 2022-05-24 PROCEDURE — G8427 DOCREV CUR MEDS BY ELIG CLIN: HCPCS | Performed by: ORTHOPAEDIC SURGERY

## 2022-05-24 NOTE — PROGRESS NOTES
Clarisa Gaffney (: 1965) is a 59 y.o. female patient, here for evaluation of the following chief complaint(s):  Knee Pain (bilateral knee pain)       ASSESSMENT/PLAN:  Below is the assessment and plan developed based on review of pertinent history, physical exam, labs, studies, and medications. 49-year-old female comes in today with complaints of bilateral knee pain. This been going on for many years. They swell on her regularly. She has had therapy oral medicine and injections in the past.  Her x-rays reveal severe tricompartmental knee osteoarthritis bilaterally. She wanted to discuss options. We discussed the possibility of total knee arthroplasty. We also discussed continued conservative management she wants to think about her options. I did give her a manual and our website for surgery so she can think about it. She said that she will call us and let us know if she wants to proceed with surgery. Risks and benefits of joint arthroplasty discussed at length including but not limited to bleeding, need for blood transfusion, infection, damage to surrounding structures, intra-operative fracture, blood clots, pulmonary embolism, death. The patient understands the risks of surgery. All questions answered. They elected to move forward. 1. Chronic pain of both knees  -     XR KNEES BI MIN 4 V; Future  2. Arthritis of left knee  3. Arthritis of knee, right      Encounter Diagnoses   Name Primary?  Chronic pain of both knees Yes    Arthritis of left knee     Arthritis of knee, right         No follow-ups on file. SUBJECTIVE/OBJECTIVE:  Clarisa Gaffney (: 1965) is a 59 y.o. female who presents today for the following:  Chief Complaint   Patient presents with    Knee Pain     bilateral knee pain       49-year-old female comes in today with complaints of bilateral knee pain. This been going on for many years. They swell on her regularly.   She has had therapy oral medicine and injections in the past.  She notices an occasional limp. She has significant varus deformity bilaterally. IMAGING:  XR Results (most recent):  Results from Appointment encounter on 05/24/22    XR KNEES BI MIN 4 V    Narrative  4 views both knees ordered and independently reviewed. Severe tricompartmental knee osteoarthritis bilaterally. No remaining joint space. She has subluxation of the femur on the tibia on both knees. Allergies   Allergen Reactions    Pcn [Penicillins] Unknown (comments)    Penicillin G Rash       Current Outpatient Medications   Medication Sig    Linzess 290 mcg cap capsule     LORazepam (ATIVAN) 1 mg tablet Take 1 mg by mouth as needed. No current facility-administered medications for this visit. Past Medical History:   Diagnosis Date    Aggressive outburst     Anxiety disorder     Artificial menopause     Bipolar affective disorder ([facility] [address] [address] 2007    Endometriosis     Ill-defined condition     Leukopenia     Psychotic disorder ([facility]     Substance abuse ([facility]         Past Surgical History:   Procedure Laterality Date    HX ORTHOPAEDIC      HX SALPINGO-OOPHORECTOMY  2009    bilateral       Family History   Problem Relation Age of Onset    Other Mother         clotting disorder    Heart Attack Father     Hypertension Father     Hypertension Sister     High Cholesterol Brother     Cancer Maternal Grandmother         lung    Alzheimer's Disease Paternal Grandmother     Cancer Paternal Grandfather         lung        Social History     Tobacco Use    Smoking status: Never Smoker    Smokeless tobacco: Never Used   Substance Use Topics    Alcohol use: Yes     Comment: social        All systems reviewed x 12 and were negative with the exception of None      No flowsheet data found. Vitals:  Ht 5' 6\" (1.676 m)   Wt 162 lb (73.5 kg)   BMI 26.15 kg/m²    Body mass index is 26.15 kg/m².     Physical Exam    General: NAD, well developed, well nourished, alert and oriented x 3. Cardiac: Extremities well perfused    Respiratory: Nonlabored breathing    LLE: Antalgic gait. Mild effusion noted. No previous incisions noted. ROM 0-120 degrees. Grossly stable to varus/valgus stress and anterior/posterior drawer tests. Fixed varus deformity. Tender over medial lateral joint line. Motor grossly intact. RLE: Antalgic gait. Mild effusion noted. No previous incisions noted. ROM 0-120 degrees. Grossly stable to varus/valgus stress and anterior/posterior drawer tests. Fixed varus deformity. Tender over medial lateral joint line. Motor grossly intact. Vascular: Palpable pedal pulses, equal bilaterally. Skin: Warm well perfused, cap refill < 2 sec. An electronic signature was used to authenticate this note.   -- Katy Xie MD

## 2022-07-14 DIAGNOSIS — M17.12 ARTHRITIS OF LEFT KNEE: Primary | ICD-10-CM

## 2022-08-15 ENCOUNTER — OFFICE VISIT (OUTPATIENT)
Dept: FAMILY MEDICINE CLINIC | Age: 57
End: 2022-08-15
Payer: MEDICARE

## 2022-08-15 VITALS
DIASTOLIC BLOOD PRESSURE: 76 MMHG | HEART RATE: 70 BPM | SYSTOLIC BLOOD PRESSURE: 127 MMHG | WEIGHT: 160 LBS | RESPIRATION RATE: 16 BRPM | OXYGEN SATURATION: 97 % | HEIGHT: 66 IN | BODY MASS INDEX: 25.71 KG/M2

## 2022-08-15 DIAGNOSIS — Z12.12 ENCOUNTER FOR COLORECTAL CANCER SCREENING: ICD-10-CM

## 2022-08-15 DIAGNOSIS — Z12.31 ENCOUNTER FOR SCREENING MAMMOGRAM FOR MALIGNANT NEOPLASM OF BREAST: ICD-10-CM

## 2022-08-15 DIAGNOSIS — Z12.11 ENCOUNTER FOR COLORECTAL CANCER SCREENING: ICD-10-CM

## 2022-08-15 DIAGNOSIS — Z00.00 MEDICARE ANNUAL WELLNESS VISIT, SUBSEQUENT: Primary | ICD-10-CM

## 2022-08-15 PROCEDURE — G0439 PPPS, SUBSEQ VISIT: HCPCS | Performed by: STUDENT IN AN ORGANIZED HEALTH CARE EDUCATION/TRAINING PROGRAM

## 2022-08-15 PROCEDURE — G9899 SCRN MAM PERF RSLTS DOC: HCPCS | Performed by: STUDENT IN AN ORGANIZED HEALTH CARE EDUCATION/TRAINING PROGRAM

## 2022-08-15 PROCEDURE — G9717 DOC PT DX DEP/BP F/U NT REQ: HCPCS | Performed by: STUDENT IN AN ORGANIZED HEALTH CARE EDUCATION/TRAINING PROGRAM

## 2022-08-15 PROCEDURE — G8427 DOCREV CUR MEDS BY ELIG CLIN: HCPCS | Performed by: STUDENT IN AN ORGANIZED HEALTH CARE EDUCATION/TRAINING PROGRAM

## 2022-08-15 PROCEDURE — 3017F COLORECTAL CA SCREEN DOC REV: CPT | Performed by: STUDENT IN AN ORGANIZED HEALTH CARE EDUCATION/TRAINING PROGRAM

## 2022-08-15 PROCEDURE — G8419 CALC BMI OUT NRM PARAM NOF/U: HCPCS | Performed by: STUDENT IN AN ORGANIZED HEALTH CARE EDUCATION/TRAINING PROGRAM

## 2022-08-15 RX ORDER — QUETIAPINE FUMARATE 300 MG/1
TABLET, FILM COATED ORAL
COMMUNITY
Start: 2022-07-27 | End: 2022-08-31

## 2022-08-15 NOTE — PROGRESS NOTES
Medicare Annual Wellness Visit    Souleymane Cerda is a 59 y.o. female    Chief Complaint   Patient presents with    Annual Wellness Visit       1. Have you been to the ER, urgent care clinic since your last visit? Hospitalized since your last visit? No  2. Have you seen or consulted any other health care providers outside of the [address] [place] [address] since your last visit? Include any pap smears or colon screening. No    Visit Vitals  /76 (BP 1 Location: Right arm, BP Patient Position: Sitting)   Pulse 70   Resp 16   Ht 5' 6\" (1.676 m)   Wt 160 lb (72.6 kg)   SpO2 97%   BMI 25.82 kg/m²         General Health Questions   -During the past 4 weeks:   -How would you rate your health in general? Good   -How often have you been bothered by feeling dizzy when standing up? never  -How much have you been bothered by bodily pain? moderately  -Has your physical and emotional health limited your social activities with family or friends? no    Emotional Health Questions   -Do you have a history of depression, anxiety, or emotional problems? yes  -Over the past 2 weeks, have you felt down, depressed or hopeless? no  -Over the past 2 weeks, have you felt little interest or pleasure in doing things? no    Depression Risk Factor Screening     3 most recent PHQ Screens 8/15/2022   PHQ Not Done -   Little interest or pleasure in doing things Not at all   Feeling down, depressed, irritable, or hopeless Not at all   Total Score PHQ 2 0       Health Habits   -Please describe your diet habits: \"Probably eats too much bread sugar trying to avoid processed food\"  -Do you get 5 servings of fruits or vegetables daily? no  -Do you exercise regularly?  yes and bicycle    Alcohol & Drug Abuse Risk Screen    Do you average more than 1 drink per night or more than 7 drinks a week:  No    On any one occasion in the past three months have you have had more than 3 drinks containing alcohol:  No            Activities of Daily Living    -Do you need help with eating, walking, dressing, bathing, toileting, the phone, transportation, shopping, preparing meals, housework, laundry, medications or managing money? no  -In the past four weeks, was someone available to help you if you needed and wanted help with anything? yes  -Are you confident are you that you can control and manage most of your health problems? yes  -Have you been given information to help you keep track of your medications? yes  -How often do you have trouble taking your medications as prescribed? never  Patient does total self care   Hearing: Hearing is good. Fall Risk and Home Safety   -Have you fallen 2 or more times in the past year? no  -Does your home have rugs in the hallways? no,   -Do you have grab bars in the bathrooms? yes,   -Does your home have handrails on the stairs? yes,   -Do you have adequate lighting in your home? yes  -Do you have smoke detectors and check them regularly? yes  -Do you have difficulties driving a car/vehicle? no  -Do you always fasten your seat belt when you are in a car? yes  -The home contains: handrails and grab bars  Fall Risk:  Fall Risk Assessment, last 12 mths 12/9/2019   Able to walk? Yes   Fall in past 12 months?  No       Abuse Screen:  Patient is not abused    Cognitive Screening    Has your family/caregiver stated any concerns about your memory: no      Mini Cog Score (65+): n/a    Health Maintenance Due     Health Maintenance Due   Topic Date Due    Colorectal Cancer Screening Combo  Never done    Shingrix Vaccine Age 49> (1 of 2) Never done    Breast Cancer Screen Mammogram  07/01/2020    Cervical cancer screen  02/01/2021    COVID-19 Vaccine (3 - Booster for Rbito Peter series) 09/14/2021    Medicare Yearly Exam  Never done       Patient Care Team   Patient Care Team:  Luis Antonio Julian MD as PCP - General (Family Medicine)  Luis Antonio Julian MD as PCP - [facility]  Namita Shetty MD (Obstetrics & Gynecology)    History Patient Active Problem List   Diagnosis Code    Bipolar affective disorder, manic, severe, with psychotic behavior ([facility] F31.2    Non-compliance with treatment Z91.19    Leukopenia D72.819    Family history of heart disease in male family member before age 54 Z80.55     Past Medical History:   Diagnosis Date    Aggressive outburst     Anxiety disorder     Artificial menopause     Bipolar affective disorder ([facility] [address] [facility] 2007    Endometriosis     Ill-defined condition     Leukopenia     [facility]     Substance abuse ([facility]       Past Surgical History:   Procedure Laterality Date    HX ORTHOPAEDIC      HX SALPINGO-OOPHORECTOMY  2009    bilateral     Current Outpatient Medications   Medication Sig Dispense Refill    QUEtiapine (SEROquel) 300 mg tablet TAKE 2.5 TABLETS AT BEDTIME. LORazepam (ATIVAN) 1 mg tablet Take 1 mg by mouth as needed.       Linzess 290 mcg cap capsule        Allergies   Allergen Reactions    Pcn [Penicillins] Unknown (comments)    Penicillin G Rash       Family History   Problem Relation Age of Onset    Other Mother         clotting disorder    Heart Attack Father     Hypertension Father     Hypertension Sister     High Cholesterol Brother     Cancer Maternal Grandmother         lung    Alzheimer's Disease Paternal Grandmother     Cancer Paternal Grandfather         lung     Social History     Tobacco Use    Smoking status: Never    Smokeless tobacco: Never   Substance Use Topics    Alcohol use: Yes     Comment: social         Health Maintenance Due   Topic Date Due    Colorectal Cancer Screening Combo  Never done    Shingrix Vaccine Age 50> (1 of 2) Never done    Breast Cancer Screen Mammogram  07/01/2020    Cervical cancer screen  02/01/2021    COVID-19 Vaccine (3 - Booster for Pfizer series) 09/14/2021    Medicare Yearly Exam  Never done         Frank Logan

## 2022-08-18 ENCOUNTER — PATIENT OUTREACH (OUTPATIENT)
Dept: CASE MANAGEMENT | Age: 57
End: 2022-08-18

## 2022-08-18 NOTE — PROGRESS NOTES
This is the Subsequent Medicare Annual Wellness Exam, performed 12 months or more after the Initial AWV or the last Subsequent AWV    I have reviewed the patient's medical history in detail and updated the computerized patient record. Assessment/Plan   Education and counseling provided:  Are appropriate based on today's review and evaluation    1. Medicare annual wellness visit, subsequent  -     REFERRAL TO ACP CLINICAL SPECIALIST  2. Encounter for colorectal cancer screening  -     REFERRAL FOR COLONOSCOPY  3. Encounter for screening mammogram for malignant neoplasm of breast  -     [facility] MAMMO BI SCREENING INCL CAD; Future       Depression Risk Factor Screening     3 most recent PHQ Screens 8/15/2022   PHQ Not Done -   Little interest or pleasure in doing things Not at all   Feeling down, depressed, irritable, or hopeless Not at all   Total Score PHQ 2 0       Alcohol & Drug Abuse Risk Screen    Do you average more than 1 drink per night or more than 7 drinks a week:  No    On any one occasion in the past three months have you have had more than 3 drinks containing alcohol:  No          Functional Ability and Level of Safety    Hearing: Hearing is good. Activities of Daily Living: The home contains: handrails and grab bars  Patient does total self care      Ambulation: with no difficulty     Fall Risk:  Fall Risk Assessment, last 12 mths 12/9/2019   Able to walk? Yes   Fall in past 12 months?  No      Abuse Screen:  Patient is not abused       Cognitive Screening    Has your family/caregiver stated any concerns about your memory: no     Cognitive Screening: Normal - Mini Cog Test    Health Maintenance Due     Health Maintenance Due   Topic Date Due    Colorectal Cancer Screening Combo  Never done    Shingrix Vaccine Age 49> (1 of 2) Never done    Breast Cancer Screen Mammogram  07/01/2020    Cervical cancer screen  02/01/2021    COVID-19 Vaccine (3 - Booster for Pfizer series) 09/14/2021    Medicare Yearly Exam  Never done       Patient Care Team   Patient Care Team:  Felicia Landeros MD as PCP - General (Family Medicine)  Felicia Landeros MD as PCP - [facility] Empaneled Provider  Garret Stinson MD (Obstetrics & Gynecology)    History     Patient Active Problem List   Diagnosis Code    Bipolar affective disorder, manic, severe, with psychotic behavior ([address] F31.2    Non-compliance with treatment Z91.19    Leukopenia D72.819    Family history of heart disease in male family member before age 54 Z80.55     Past Medical History:   Diagnosis Date    Aggressive outburst     Anxiety disorder     Artificial menopause     Bipolar affective disorder ([address] 2007    Endometriosis     Ill-defined condition     Leukopenia     Psychotic disorder ([address]     Substance abuse ([address]       Past Surgical History:   Procedure Laterality Date    HX ORTHOPAEDIC      HX SALPINGO-OOPHORECTOMY  2009    bilateral     Current Outpatient Medications   Medication Sig Dispense Refill    QUEtiapine (SEROquel) 300 mg tablet TAKE 2.5 TABLETS AT BEDTIME. LORazepam (ATIVAN) 1 mg tablet Take 1 mg by mouth as needed.       Linzess 290 mcg cap capsule        Allergies   Allergen Reactions    Pcn [Penicillins] Unknown (comments)    Penicillin G Rash       Family History   Problem Relation Age of Onset    Other Mother         clotting disorder    Heart Attack Father     Hypertension Father     Hypertension Sister     High Cholesterol Brother     Cancer Maternal Grandmother         lung    Alzheimer's Disease Paternal Grandmother     Cancer Paternal Grandfather         lung     Social History     Tobacco Use    Smoking status: Never    Smokeless tobacco: Never   Substance Use Topics    Alcohol use: Yes     Comment: social John Carvajal MD

## 2022-08-18 NOTE — ACP (ADVANCE CARE PLANNING)
Advance Care Planning   Ambulatory ACP Specialist Patient Outreach    Date:  8/18/2022    ACP Specialist:  Meri Godinez LPN    Outreach call to patient in follow-up to ACP Specialist referral from:    [x] PCP  [] Provider   [] Ambulatory Care Management [] Other     For:                  [x] Advance Directive Assistance              [] Complete Portable DNR order              [] Complete POST/MOST              [] Code Status Discussion             [] Discuss Goals of Care             [] Early ACP Decision-Making              [] Other (Specify)    Date Referral Received: 8/18/22    Today's Outreach:  [x] First   [] Second  [] Third       Third outreach made by: [] Phone  [] Email / mail    [] [place]     Intervention:  [x] Spoke with Patient   [] Left VM requesting return call      Outcome:   LPN spoke with the pt who wishes to move forward in having an ACP conversation with an ACP specialist. Pt is alert and oriented x4. Appointment scheduled for 8/25/22 at 9 am. ACP information has been e-mailed to the pt for review prior to the appointment     Next Step:   [x] ACP scheduled conversation  [] Outreach again in one week               [x] Email / Mail [address]  [] Email / Mail Advance Directive   [] Closing referral.  Routing closure to referring provider/staff and to ACP Specialist . [] Closure letter mailed to patient with invitation to contact ACP Specialist if / when ready.   Thank you for this referral.

## 2022-08-18 NOTE — PATIENT INSTRUCTIONS
Medicare Wellness Visit, Female     The best way to live healthy is to have a lifestyle where you eat a well-balanced diet, exercise regularly, limit alcohol use, and quit all forms of tobacco/nicotine, if applicable. Regular preventive services are another way to keep healthy. Preventive services (vaccines, screening tests, monitoring & exams) can help personalize your care plan, which helps you manage your own care. Screening tests can find health problems at the earliest stages, when they are easiest to treat. [facility] follows the current, evidence-based guidelines published by the [facility] ([facility]) when recommending preventive services for our patients. Because we follow these guidelines, sometimes recommendations change over time as research supports it. (For example, mammograms used to be recommended annually. Even though Medicare will still pay for an annual mammogram, the newer guidelines recommend a mammogram every two years for women of average risk). Of course, you and your doctor may decide to screen more often for some diseases, based on your risk and your co-morbidities (chronic disease you are already diagnosed with). Preventive services for you include:  - Medicare offers their members a free annual wellness visit, which is time for you and your primary care provider to discuss and plan for your preventive service needs. Take advantage of this benefit every year!  -All adults over the age of 72 should receive the recommended pneumonia vaccines. Current USPSTF guidelines recommend a series of two vaccines for the best pneumonia protection.   -All adults should have a flu vaccine yearly and a tetanus vaccine every 10 years.   -All adults age 48 and older should receive the shingles vaccines (series of two vaccines).       -All adults age 38-68 who are overweight should have a diabetes screening test once every three years.   -All adults born between [birth year] and 1967 should be screened once for Hepatitis C.  -Other screening tests and preventive services for persons with diabetes include: an eye exam to screen for diabetic retinopathy, a kidney function test, a foot exam, and stricter control over your cholesterol.   -Cardiovascular screening for adults with routine risk involves an electrocardiogram (ECG) at intervals determined by your doctor.   -Colorectal cancer screenings should be done for adults age 54-65 with no increased risk factors for colorectal cancer. There are a number of acceptable methods of screening for this type of cancer. Each test has its own benefits and drawbacks. Discuss with your doctor what is most appropriate for you during your annual wellness visit. The different tests include: colonoscopy (considered the best screening method), a fecal occult blood test, a fecal DNA test, and sigmoidoscopy.    -A bone mass density test is recommended when a woman turns 65 to screen for osteoporosis. This test is only recommended one time, as a screening. Some providers will use this same test as a disease monitoring tool if you already have osteoporosis. -Breast cancer screenings are recommended every other year for women of normal risk, age 54-69.  -Cervical cancer screenings for women over age 72 are only recommended with certain risk factors.      Here is a list of your current Health Maintenance items (your personalized list of preventive services) with a due date:  Health Maintenance Due   Topic Date Due    Colorectal Screening  Never done    Shingles Vaccine (1 of 2) Never done    Mammogram  07/01/2020    Cervical cancer screen  02/01/2021    COVID-19 Vaccine (3 - Booster for Pfizer series) 09/14/2021    Annual Well Visit  Never done

## 2022-08-25 ENCOUNTER — DOCUMENTATION ONLY (OUTPATIENT)
Dept: CASE MANAGEMENT | Age: 57
End: 2022-08-25

## 2022-08-25 NOTE — ACP (ADVANCE CARE PLANNING)
Advance Care Planning     Advance Care Planning Clinical Specialist  Conversation Note      Date of ACP Conversation: 08/25/22    Conversation Conducted with:  Patient with Decision Making Capacity    ACP Clinical Specialist: Viktoria Tamayo RN      Health Care Decision Maker:    Current Designated Health Care Decision Maker:     Primary Decision Maker: Clint Kim - Sister - [phone]    Secondary Decision Maker: Hernando Cain - Other Relative - [phone]    Care Preferences    Hospitalization: \"If your health worsens and it becomes clear that your chance of recovery is unlikely, what would your preference be regarding hospitalization? \"    Choice:  []  The patient wants hospitalization  [x]  The patient prefers comfort-focused treatment without hospitalization. Ventilation: \"If you were in your present state of health and suddenly became very ill and were unable to breathe on your own, what would your preference be about the use of a ventilator (breathing machine) if it were available to you? \"      If patient would desire the use of a ventilator (breathing machine), answer \"yes\", if not \"no\":yes    \"If your health worsens and it becomes clear that your chance of recovery is unlikely, what would your preference be about the use of a ventilator (breathing machine) if it were available to you? \"     Would the patient desire the use of a ventilator (breathing machine)? NO      Resuscitation  \"CPR works best to restart the heart when there is a sudden event, like a heart attack, in someone who is otherwise healthy. Unfortunately, CPR does not typically restart the heart for people who have serious health conditions or who are very sick. \"    \"In the event your heart stopped as a result of an underlying serious health condition, would you want attempts to be made to restart your heart (answer \"yes\" for attempt to resuscitate) or would you prefer a natural death (answer \"no\" for do not attempt to resuscitate)? \" yes      [x] Yes  [] No   Educated Patient / Adelina Cowart regarding differences between Advance Directives and portable DNR orders. Length of ACP Conversation in minutes:  28    Conversation Outcomes:  [x] ACP discussion completed  [] Existing advance directive reviewed with patient; no changes to patient's previously recorded wishes   [x] New Advance Directive completed   [] Portable Do Not Resuscitate prepared for Provider review and signature  [] POLST/POST/MOLST/MOST prepared for Provider review and signature      Follow-up plan:    [] Schedule follow-up conversation to continue planning  [] Referred individual to Provider for additional questions/concerns   [] Advised patient/agent/surrogate to review completed ACP document and update if needed with changes in condition, patient preferences or care setting     [x] This note routed to one or more involved healthcare providers    RN reviewed above questions with pt and her answers are indicated. An AMD was completed and sent to pt via email through "[employer]. When the document has been signed by all required signers and returned, it will be scanned to pt's chart.  Sanket Dye RN

## 2022-08-31 ENCOUNTER — HOSPITAL ENCOUNTER (OUTPATIENT)
Dept: PREADMISSION TESTING | Age: 57
Discharge: HOME | End: 2022-08-31
Payer: MEDICARE

## 2022-08-31 VITALS
SYSTOLIC BLOOD PRESSURE: 130 MMHG | WEIGHT: 156.53 LBS | HEART RATE: 77 BPM | BODY MASS INDEX: 26.72 KG/M2 | TEMPERATURE: 98.3 F | DIASTOLIC BLOOD PRESSURE: 80 MMHG | HEIGHT: 64 IN

## 2022-08-31 LAB
ABO + RH BLD: NORMAL
ANION GAP SERPL CALC-SCNC: 4 MMOL/L (ref 5–15)
APPEARANCE UR: CLEAR
BACTERIA URNS QL MICRO: ABNORMAL /HPF
BILIRUB UR QL: NEGATIVE
BLOOD GROUP ANTIBODIES SERPL: NORMAL
BUN SERPL-MCNC: 16 MG/DL (ref 6–20)
BUN/CREAT SERPL: 23 (ref 12–20)
CALCIUM SERPL-MCNC: 9.1 MG/DL (ref 8.5–10.1)
CHLORIDE SERPL-SCNC: 107 MMOL/L (ref 97–108)
CO2 SERPL-SCNC: 28 MMOL/L (ref 21–32)
COLOR UR: ABNORMAL
CREAT SERPL-MCNC: 0.71 MG/DL (ref 0.55–1.02)
EPITH CASTS URNS QL MICRO: ABNORMAL /LPF
ERYTHROCYTE [DISTWIDTH] IN BLOOD BY AUTOMATED COUNT: 12.6 % (ref 11.5–14.5)
EST. AVERAGE GLUCOSE BLD GHB EST-MCNC: 97 MG/DL
GLUCOSE SERPL-MCNC: 95 MG/DL (ref 65–100)
GLUCOSE UR STRIP.AUTO-MCNC: NEGATIVE MG/DL
HBA1C MFR BLD: 5 % (ref 4–5.6)
HCT VFR BLD AUTO: 37.3 % (ref 35–47)
HGB BLD-MCNC: 12.9 G/DL (ref 11.5–16)
HGB UR QL STRIP: NEGATIVE
INR PPP: 1 (ref 0.9–1.1)
KETONES UR QL STRIP.AUTO: NEGATIVE MG/DL
LEUKOCYTE ESTERASE UR QL STRIP.AUTO: NEGATIVE
MCH RBC QN AUTO: 31.5 PG (ref 26–34)
MCHC RBC AUTO-ENTMCNC: 34.6 G/DL (ref 30–36.5)
MCV RBC AUTO: 91 FL (ref 80–99)
NITRITE UR QL STRIP.AUTO: NEGATIVE
NRBC # BLD: 0 K/UL (ref 0–0.01)
NRBC BLD-RTO: 0 PER 100 WBC
PH UR STRIP: 5.5 [PH] (ref 5–8)
PLATELET # BLD AUTO: 301 K/UL (ref 150–400)
PMV BLD AUTO: 8.9 FL (ref 8.9–12.9)
POTASSIUM SERPL-SCNC: 3.8 MMOL/L (ref 3.5–5.1)
PROT UR STRIP-MCNC: NEGATIVE MG/DL
PROTHROMBIN TIME: 10.2 SEC (ref 9–11.1)
RBC # BLD AUTO: 4.1 M/UL (ref 3.8–5.2)
RBC #/AREA URNS HPF: ABNORMAL /HPF (ref 0–5)
SODIUM SERPL-SCNC: 139 MMOL/L (ref 136–145)
SP GR UR REFRACTOMETRY: 1.01 (ref 1–1.03)
SPECIMEN EXP DATE BLD: NORMAL
UA: UC IF INDICATED,UAUC: ABNORMAL
UROBILINOGEN UR QL STRIP.AUTO: 0.2 EU/DL (ref 0.2–1)
WBC # BLD AUTO: 4.2 K/UL (ref 3.6–11)
WBC URNS QL MICRO: ABNORMAL /HPF (ref 0–4)
YEAST BUDDING URNS QL: PRESENT

## 2022-08-31 PROCEDURE — 93005 ELECTROCARDIOGRAM TRACING: CPT

## 2022-08-31 PROCEDURE — 85027 COMPLETE CBC AUTOMATED: CPT

## 2022-08-31 PROCEDURE — 85610 PROTHROMBIN TIME: CPT

## 2022-08-31 PROCEDURE — 36415 COLL VENOUS BLD VENIPUNCTURE: CPT

## 2022-08-31 PROCEDURE — 86900 BLOOD TYPING SEROLOGIC ABO: CPT

## 2022-08-31 PROCEDURE — 81001 URINALYSIS AUTO W/SCOPE: CPT

## 2022-08-31 PROCEDURE — 83036 HEMOGLOBIN GLYCOSYLATED A1C: CPT

## 2022-08-31 PROCEDURE — 80048 BASIC METABOLIC PNL TOTAL CA: CPT

## 2022-08-31 RX ORDER — ASCORBIC ACID 500 MG
1000 TABLET ORAL DAILY
COMMUNITY

## 2022-08-31 RX ORDER — ESTRADIOL 0.1 MG/G
2 CREAM VAGINAL AS NEEDED
COMMUNITY
End: 2022-09-07

## 2022-08-31 RX ORDER — BUTYROSPERMUM PARKII(SHEA BUTTER), SIMMONDSIA CHINENSIS (JOJOBA) SEED OIL, ALOE BARBADENSIS LEAF EXTRACT .01; 1; 3.5 G/100G; G/100G; G/100G
100 LIQUID TOPICAL
COMMUNITY

## 2022-08-31 NOTE — PERIOP NOTES
[address]  ORTHOPAEDIC    Surgery Date:   9/7    Your surgeon's office or [address] [place] [address] staff will call you between 4 PM- 8 PM the day before surgery with your arrival time. If your surgery is on a Monday, you will receive a call the preceding Friday. Please report to [facility] Patient Access/Admitting on the 1st floor. Bring your insurance card, photo identification, and any copayment (if applicable). If you are going home the same day of your surgery, you must have a responsible adult to drive you home. You need to have a responsible adult to stay with you the first 24 hours after surgery and you should not drive a car for 24 hours following your surgery. Do NOT eat any solid foods after midnight the night before surgery including candy, mints or gum. You may drink clear liquids from midnight until 1 hour prior to arrival time. You may drink up to 12 ounces at one time every 4 hours. Do NOT drink alcohol or smoke 24 hours before surgery. STOP smoking for 14 days prior as it helps with breathing and healing after surgery. If your arrival time is 3pm or later, you may eat a light breakfast before 8am (toast, bagel-no butter, black coffee, plain tea, fruit juice-no pulp) Please note special instructions, if applicable, below for medications. If you are being admitted to the hospital,please leave personal belongings/luggage in your car until you have an assigned hospital room number. Please wear comfortable clothes. Wear your glasses instead of contacts. We ask that all money, jewelry and valuables be left at home. Wear no make up, particularly mascara, the day of surgery. All body piercings, rings, and jewelry need to be removed and left at home. Please remove any nail polish or artificial nails from your fingernails. Please wear your hair loose or down. Please no pony-tails, buns, or any metal hair accessories.  If you shower the morning of surgery, please do not apply any lotions or powders afterwards. You may wear deodorant. Do not shave any body area within 24 hours of your surgery. Please follow all instructions to avoid any potential surgical cancellation. Should your physical condition change, (i.e. fever, cold, flu, etc.) please notify your surgeon as soon as possible. It is important to be on time. If a situation occurs where you may be delayed, please call:  [phone] / [phone] on the day of surgery. The Preadmission Testing staff can be reached at [phone]. Special instructions: BRING COVID CARD    Current Outpatient Medications   Medication Sig    quetiapine fumarate (QUETIAPINE PO) Take 750 mg by mouth nightly. multivit-min/iron/folic/lutein (MULTIVITAMIN WOMEN 50 PLUS PO) Take 1 Tablet by mouth every morning. docosahexaenoic acid/epa (FISH OIL PO) Take 1,200 mg by mouth daily. tumeric-ging-olive-oreg-capryl 100 mg-150 mg- 50 mg-150 mg cap Take  by mouth. OTHER 1 Tablet every morning. TUMERIC 450 MG WITH GINGER 50 MG    Lacto no.76/Bifido/FOS/larch (WOMEN'S PROBIOTIC PO) Take 10 mg by mouth daily. magnesium citrate 100 mg cap Take 100 mg by mouth every morning. ubidecarenone/vitamin E mixed (COQ10  PO) Take 200 mg by mouth every morning.    herbal drugs (COLON HERBAL CLEANSER PO) Take 1,200 mg by mouth nightly as needed. estradioL (ESTRACE) 0.01 % (0.1 mg/gram) vaginal cream Insert 2 g into vagina as needed. cholecalciferol, vitamin D3, (VITAMIN D3 PO) Take 125 mcg by mouth daily. ascorbic acid, vitamin C, (Vitamin C) 500 mg tablet Take 1,000 mg by mouth daily. LORazepam (ATIVAN) 1 mg tablet Take 1 mg by mouth as needed. No current facility-administered medications for this encounter.        YOU MUST ONLY TAKE THESE MEDICATIONS THE MORNING OF SURGERY WITH A SIP OF WATER: NONE  MEDICATIONS TO TAKE THE MORNING OF SURGERY ONLY IF NEEDED: LORAZEPAM  HOLD these prescription medications BEFORE Surgery: STARTING TODAY 8/31 STOP MULTIVITAMIN, FISH OIL, TUMERIC, PROBIOTIC MAG. CITRATE, COQ10, HERBAL CLEANSE, VIT. D3, AND VIT. C   Ask your surgeon/prescribing physician about when/if to STOP taking these medications:   Stop any non-steroidal anti-inflammatory drugs (i.e. Ibuprofen, Naproxen, Advil, Aleve) 3 days before surgery. You may take Tylenol. STOP all vitamins and herbal supplements 1 week prior to  surgery. If you are currently taking Plavix, Coumadin, or any other blood-thinning/anticoagulant medication contact your prescribing physician for instructions. Preventing Infections Before and After - Your Surgery    IMPORTANT INSTRUCTIONS    You play an important role in your health and preparation for surgery. To reduce the germs on your skin you will need to shower with CHG soap (Chorhexidine gluconate 4%) two times before surgery. CHG soap (Hibiclens, Hex-A-Clens or store brand)  CHG soap will be provided at your Preadmission Testing (PAT) appointment. If you do not have a PAT appointment before surgery, you may arrange to  CHG soap from our office or purchase CHG soap at a pharmacy, grocery or department store. You need to purchase TWO 4 ounce bottles to use for your 2 showers. Steps to follow:  [place] your hair with your normal shampoo and your body with regular soap and rinse well to remove shampoo and soap from your skin. Wet a clean washcloth and turn off the shower. Put CHG soap on washcloth and apply to your entire body from the neck down. Do not use on your head, face or private parts(genitals). Do not use CHG soap on open sores, wounds or areas of skin irritation. Wash you body gently for 5 minutes. Do not wash your skin too hard. This soap does not create lather. Pay special attention to your underarms and from your belly button to your feet. Turn the shower back on and rinse well to get CHG soap off your body. Pat your skin dry with a clean, dry towel.  Do not apply lotions or moisturizer. Put on clean clothes and sleep on fresh bed sheets and do not allow pets to sleep with you. Shower with CHG soap 2 times before your surgery  The evening before your surgery  The morning of your surgery      Tips to help prevent infections after your surgery:  Protect your surgical wound from germs:  Hand washing is the most important thing you and your caregivers can do to prevent infections. Keep your bandage clean and dry! Do not touch your surgical wound. Use clean, freshly washed towels and washcloths every time you shower; do not share bath linens with others. Until your surgical wound is healed, wear clothing and sleep on bed linens each day that are clean and freshly washed. Do not allow pets to sleep in your bed with you or touch your surgical wound. Do not smoke - smoking delays wound healing. This may be a good time to stop smoking. If you have diabetes, it is important for you to manage your blood sugar levels properly before your surgery as well as after your surgery. Poorly managed blood sugar levels slow down wound healing and prevent you from healing completely. Prevention of Infection  Testing for Staphylococcus aureus on your skin before surgery    Staphylococcus aureus (staph) is a common bacteria that is found on the body. It normally does not cause infection on healthy skin. Before surgery, you will be tested to see if you have staph by swabbing the inside of your nose. When you have an incision with surgery, the goal is to protect that incision from infection. Removal of the staph bacteria before surgery can decrease the risk of a surgical site infection. If your nose swab is positive for staph you will be called. Your treatment will include 2 steps:  Prescription for Mupirocin ointment to be used in each nostril twice a day for 5 days. Showering with Chlorhexidine (CHG) liquid soap for 5 days prior to surgery.     How to use Mupirocin ointment in your nose  Pick up the prescription from your pharmacy. You will receive a large tube of ointment which will be big enough for all of your treatments. You will apply this ointment to each nostril 2 times a day for 5 days. Wash your hands with  gel or soap and water for 20 seconds before using ointment. Place a pea-sized amount of ointment on a cotton Q-tip. Apply ointment just inside of each nostril with the Q-tip. Do not push Q-tip or ointment deep inside you nose. Press your nostrils together and massage for a few seconds. Wash your hands with  gel or soap and water after you are finished. Do not get ointment near your eyes. If it gets into your eyes, rinse them with cool water. If you need to use nasal spray, clean the tip of the bottle with alcohol before use and do not use both at the same time. If you are scheduled for COVID testing during the 5 days, do NOT apply morning dose until after the COVID test has been performed. How to use Chlorhexidine (CHG) 4% liquid soap  Purchase an 8 ounce bottle of CHG liquid soap (Chlorhexidine 4%, Hibiclens, Hex-A-Clens or store brand) at a pharmacy or grocery store. Wash your hair with your normal shampoo and your body with regular soap and rinse well to remove shampoo and soap from your skin. Wet a clean washcloth and turn off the shower. Put CHG soap on washcloth and apply to your entire body from the neck down. Do not use on your head, face or private parts(genitals). Do not use CHG soap on open sores, wounds or areas of skin irritation. Wash your body gently for 5 minutes. Do not wash your skin too hard. This soap does not create lather. Pay special attention to your underarms and from your belly button to your feet. Turn the shower back on and rinse well to get CHG soap off your body. Pat your skin dry with a clean, dry towel. Do not apply lotions or moisturizer. Put on clean clothes and sleep on fresh bed sheets the night before surgery.  Do not allow pets to sleep with you. Eating and Drinking Before Surgery    You may eat a regular dinner at the usual time on the day before your surgery. Do NOT eat any solid foods after midnight unless your arrival time at the hospital is 3pm or later. You may drink clear liquids only from 12 midnight until 1 hours prior to your arrival time at the hospital on the day of your surgery. Do NOT drink alcohol. Clear liquids include:  Water  Fruit juices without pulp( i.e. apple juice)  Carbonated beverages  Black coffee (no cream/milk)  Tea (no cream/milk)  Gatorade  You may drink up to 12-16 ounces at one time every 4 hours between the hours of midnight and 1 hour before your arrival time at the hospital. Example- if your arrival time at the hospital is 6am, you may drink 12-16 ounces of clear liquids no later than 5am.  If your arrival time at the hospital is 3pm or later, you may eat a light breakfast before 8am.  A light breakfast includes: Toast or bagel (no butter)  Black coffee (no cream/milk)  Tea (no cream/milk)  Fruit juices without pulp ( i.e. apple juice)  Do NOT eat meat, eggs, vegetables or fruit  If you have any questions, please contact your surgeon's office. Patient Information Regarding COVID Restrictions    Day of Procedure    Please park in the parking deck or any designated visitor parking lot. Enter the facility through the Main Entrance of the hospital.  On the day of surgery, please provide the cell phone number of the person who will be waiting for you to the Patient Access representative at the time of registration. Please wear a mask on the day of your procedure. We are now allowing two designated visitors per stay. Pediatric patients may have 2 designated visitors. These two people may come in with you on the day of your procedure. The designated visitor must also wear a mask.   Once your procedure and the immediate recovery period is completed, a nurse in the recovery area will contact your designated visitor to inform them of your room number or to otherwise review other pertinent information regarding your care. Social distancing practices are to be adhered to in waiting areas and the cafeteria. The patient was contacted in person. She verbalized understanding of all instructions does not  need reinforcement.

## 2022-09-01 LAB
ATRIAL RATE: 60 BPM
BACTERIA SPEC CULT: NORMAL
BACTERIA SPEC CULT: NORMAL
CALCULATED P AXIS, ECG09: 60 DEGREES
CALCULATED R AXIS, ECG10: -5 DEGREES
CALCULATED T AXIS, ECG11: 36 DEGREES
DIAGNOSIS, 93000: NORMAL
P-R INTERVAL, ECG05: 180 MS
Q-T INTERVAL, ECG07: 432 MS
QRS DURATION, ECG06: 94 MS
QTC CALCULATION (BEZET), ECG08: 432 MS
SERVICE CMNT-IMP: NORMAL
VENTRICULAR RATE, ECG03: 60 BPM

## 2022-09-01 NOTE — PERIOP NOTES
PAT Nurse Practitioner   Pre-Operative Chart Review/Assessment:-ORTHOPEDIC                Patient Name:  Geo Mark                                                           Age:   59 y.o.    :  1965     Today's Date:  2022     Date of PAT:   2022      Date of Surgery:    2022      Procedure(s):  Left Total Knee Arthroplasty     Surgeon:   Dr. Vikas Baker                       PLAN:      1)  Medical Clearance:  Dr. Louise Duane      2)  Cardiac Clearance:  EKG and METs reviewed. No further cardiac evaluation requested. PAT EKG showed normal sinus rhythm. 3)  Diabetic Treatment Consult:  Not indicated. A1c-5.0      4)  Sleep Apnea evaluation:   Not indicated. PIPO Score 1.       5) Treatment for MRSA/Staph Aureus:  Neg      6) Additional Concerns:  Bipolar, anxiety                Vital Signs:         Vitals:    22 0947   BP: 130/80   Pulse: 77   Temp: 98.3 °F (36.8 °C)   Weight: 71 kg (156 lb 8.4 oz)   Height: 5' 4.25\" (1.632 m)          Body mass index is 26.66 kg/m².         ____________________________________________  PAST MEDICAL HISTORY  Past Medical History:   Diagnosis Date    Aggressive outburst     Anxiety disorder     Arthritis 2008    Knees and hands    Artificial menopause     Bipolar affective disorder ([facility] 2007    Endometriosis     Leukopenia     Psychiatric disorder [id number]    Bipolar episodes    Psychotic disorder ([facility]     Substance abuse ([facility]       ____________________________________________  PAST SURGICAL HISTORY  Past Surgical History:   Procedure Laterality Date    HX BREAST BIOPSY Right     Benign    HX GI      HX ORTHOPAEDIC  2018    REPAIR BROKEN FEMUR/HIP    HX SALPINGO-OOPHORECTOMY Bilateral       ____________________________________________  HOME MEDICATIONS  Current Outpatient Medications   Medication Sig    quetiapine fumarate (QUETIAPINE PO) Take 750 mg by mouth nightly.     multivit-min/iron/folic/lutein (MULTIVITAMIN WOMEN 50 PLUS PO) Take 1 Tablet by mouth every morning. docosahexaenoic acid/epa (FISH OIL PO) Take 1,200 mg by mouth daily. tumeric-ging-olive-oreg-capryl 100 mg-150 mg- 50 mg-150 mg cap Take  by mouth. OTHER 1 Tablet every morning. TUMERIC 450 MG WITH GINGER 50 MG    Lacto no.76/Bifido/FOS/larch (WOMEN'S PROBIOTIC PO) Take 10 mg by mouth daily. magnesium citrate 100 mg cap Take 100 mg by mouth every morning. ubidecarenone/vitamin E mixed (COQ10  PO) Take 200 mg by mouth every morning.    herbal drugs (COLON HERBAL CLEANSER PO) Take 1,200 mg by mouth nightly as needed. estradioL (ESTRACE) 0.01 % (0.1 mg/gram) vaginal cream Insert 2 g into vagina as needed. cholecalciferol, vitamin D3, (VITAMIN D3 PO) Take 125 mcg by mouth daily. ascorbic acid, vitamin C, (Vitamin C) 500 mg tablet Take 1,000 mg by mouth daily. LORazepam (ATIVAN) 1 mg tablet Take 1 mg by mouth as needed. No current facility-administered medications for this encounter.      ____________________________________________  ALLERGIES  Allergies   Allergen Reactions    Pcn [Penicillins] Unknown (comments)     NO REPORTED SEVERE NON-IGE-MEDIATED REACTIONS    Penicillin G Rash     NO REPORTED SEVERE NON-IGE-MEDIATED REACTON      ____________________________________________  SOCIAL HISTORY  Social History     Tobacco Use    Smoking status: Never    Smokeless tobacco: Never   Substance Use Topics    Alcohol use:  Yes     Alcohol/week: 4.0 standard drinks     Types: 4 Shots of liquor per week      ____________________________________________   Internal Administration   First Dose COVID-19, PFIZER PURPLE top, DILUTE for use, (age 15 y+), IM, 30mcg/0.3mL  03/24/2021   Second Dose COVID-19, PFIZER PURPLE top, DILUTE for use, (age 15 y+), IM, 30mcg/0.3mL  04/14/2021      Last COVID Lab SARS-CoV-2, DOMINIK (no units)   Date Value   01/19/2021 Not Detected                    Labs:     Hospital Outpatient Visit on 08/31/2022   Component Date Value Ref Range Status    Sodium 08/31/2022 139  136 - 145 mmol/L Final    Potassium 08/31/2022 3.8  3.5 - 5.1 mmol/L Final    Chloride 08/31/2022 107  97 - 108 mmol/L Final    CO2 08/31/2022 28  21 - 32 mmol/L Final    Anion gap 08/31/2022 4 (A) 5 - 15 mmol/L Final    Glucose 08/31/2022 95  65 - 100 mg/dL Final    BUN 08/31/2022 16  6 - 20 MG/DL Final    Creatinine 08/31/2022 0.71  0.55 - 1.02 MG/DL Final    BUN/Creatinine ratio 08/31/2022 23 (A) 12 - 20   Final    GFR est AA 08/31/2022 >60  >60 ml/min/1.73m2 Final    GFR est non-AA 08/31/2022 >60  >60 ml/min/1.73m2 Final    Estimated GFR is calculated using the IDMS-traceable Modification of Diet in Renal Disease (MDRD) Study equation, reported for both  Americans (GFRAA) and non- Americans (GFRNA), and normalized to 1.73m2 body surface area. The physician must decide which value applies to the patient. Calcium 08/31/2022 9.1  8.5 - 10.1 MG/DL Final    WBC 08/31/2022 4.2  3.6 - 11.0 K/uL Final    RBC 08/31/2022 4.10  3.80 - 5.20 M/uL Final    HGB 08/31/2022 12.9  11.5 - 16.0 g/dL Final    HCT 08/31/2022 37.3  35.0 - 47.0 % Final    MCV 08/31/2022 91.0  80.0 - 99.0 FL Final    MCH 08/31/2022 31.5  26.0 - 34.0 PG Final    MCHC 08/31/2022 34.6  30.0 - 36.5 g/dL Final    RDW 08/31/2022 12.6  11.5 - 14.5 % Final    PLATELET 77/26/2033 415  150 - 400 K/uL Final    MPV 08/31/2022 8.9  8.9 - 12.9 FL Final    NRBC 08/31/2022 0.0  0  WBC Final    ABSOLUTE NRBC 08/31/2022 0.00  0.00 - 0.01 K/uL Final    Crossmatch Expiration 08/31/2022 09/10/2022,2359   Final    ABO/Rh(D) 08/31/2022 A POSITIVE   Final    Antibody screen 08/31/2022 NEG   Final    INR 08/31/2022 1.0  0.9 - 1.1   Final    A single therapeutic range for Vit K antagonists may not be optimal for all indications - see June, 2008 issue of Chest, American College of Chest Physicians Evidence-Based Clinical Practice Guidelines, 8th Edition.     Prothrombin time 08/31/2022 10.2  9.0 - 11.1 sec Final Color 08/31/2022 YELLOW/STRAW    Final    Color Reference Range: Straw, Yellow or Dark Yellow    Appearance 08/31/2022 CLEAR  CLEAR   Final    Specific gravity 08/31/2022 1.009  1.003 - 1.030   Final    pH (UA) 08/31/2022 5.5  5.0 - 8.0   Final    Protein 08/31/2022 Negative  NEG mg/dL Final    Glucose 08/31/2022 Negative  NEG mg/dL Final    Ketone 08/31/2022 Negative  NEG mg/dL Final    Bilirubin 08/31/2022 Negative  NEG   Final    Blood 08/31/2022 Negative  NEG   Final    Urobilinogen 08/31/2022 0.2  0.2 - 1.0 EU/dL Final    Nitrites 08/31/2022 Negative  NEG   Final    Leukocyte Esterase 08/31/2022 Negative  NEG   Final    WBC 08/31/2022 0-4  0 - 4 /hpf Final    RBC 08/31/2022 0-5  0 - 5 /hpf Final    Epithelial cells 08/31/2022 FEW  FEW /lpf Final    Epithelial cell category consists of squamous cells and /or transitional urothelial cells. Renal tubular cells, if present, are separately identified as such.     Bacteria 08/31/2022 1+ (A) NEG /hpf Final    UA:UC IF INDICATED 08/31/2022 CULTURE NOT INDICATED BY UA RESULT  CNI   Final    Budding yeast 08/31/2022 PRESENT (A) NEG   Final    Ventricular Rate 08/31/2022 60  BPM Final    Atrial Rate 08/31/2022 60  BPM Final    P-R Interval 08/31/2022 180  ms Final    QRS Duration 08/31/2022 94  ms Final    Q-T Interval 08/31/2022 432  ms Final    QTC Calculation (Bezet) 08/31/2022 432  ms Final    Calculated P Axis 08/31/2022 60  degrees Final    Calculated R Axis 08/31/2022 -5  degrees Final    Calculated T Axis 08/31/2022 36  degrees Final    Diagnosis 08/31/2022    Final                    Value:Normal sinus rhythm  Normal ECG  When compared with ECG of 09-SEP-2009 15:38,  No significant change was found  Confirmed by Juli Ardon M.D., Christine Du ([id number]) on 9/1/2022 11:30:51 AM      Hemoglobin A1c 08/31/2022 5.0  4.0 - 5.6 % Final    Comment: NEW METHOD  PLEASE NOTE NEW REFERENCE RANGE  (NOTE)  HbA1C Interpretive Ranges  <5.7              Normal  5.7 - 6.4         Consider Prediabetes  >6.5              Consider Diabetes      Est. average glucose 08/31/2022 97  mg/dL Final    Special Requests: 08/31/2022 NO SPECIAL REQUESTS    Final    Culture result: 08/31/2022 MRSA NOT PRESENT    Final    Culture result: 08/31/2022     Final                    Value:Screening of patient nares for MRSA is for surveillance purposes and, if positive, to facilitate isolation considerations in high risk settings. It is not intended for automatic decolonization interventions per se as regimens are not sufficiently effective to warrant routine use. Skin:     Denies open wounds, cuts, sores, rashes or other areas of concern in PAT assessment.           Terry Goldstein, NP

## 2022-09-06 ENCOUNTER — ANESTHESIA EVENT (OUTPATIENT)
Dept: SURGERY | Age: 57
End: 2022-09-06
Payer: MEDICARE

## 2022-09-07 ENCOUNTER — HOME HEALTH ADMISSION (OUTPATIENT)
Dept: HOME HEALTH SERVICES | Facility: HOME HEALTH | Age: 57
End: 2022-09-07
Payer: MEDICARE

## 2022-09-07 ENCOUNTER — HOSPITAL ENCOUNTER (OUTPATIENT)
Age: 57
Discharge: HOME HEALTH | End: 2022-09-07
Attending: ORTHOPAEDIC SURGERY | Admitting: ORTHOPAEDIC SURGERY
Payer: MEDICARE

## 2022-09-07 ENCOUNTER — ANESTHESIA (OUTPATIENT)
Dept: SURGERY | Age: 57
End: 2022-09-07
Payer: MEDICARE

## 2022-09-07 VITALS
OXYGEN SATURATION: 97 % | WEIGHT: 156.53 LBS | RESPIRATION RATE: 16 BRPM | SYSTOLIC BLOOD PRESSURE: 133 MMHG | TEMPERATURE: 97.5 F | BODY MASS INDEX: 26.72 KG/M2 | HEIGHT: 64 IN | HEART RATE: 75 BPM | DIASTOLIC BLOOD PRESSURE: 79 MMHG

## 2022-09-07 DIAGNOSIS — Z96.652 S/P TOTAL KNEE REPLACEMENT, LEFT: Primary | ICD-10-CM

## 2022-09-07 DIAGNOSIS — M17.12 OSTEOARTHRITIS OF LEFT KNEE, UNSPECIFIED OSTEOARTHRITIS TYPE: ICD-10-CM

## 2022-09-07 LAB
GLUCOSE BLD STRIP.AUTO-MCNC: 94 MG/DL (ref 65–117)
SERVICE CMNT-IMP: NORMAL

## 2022-09-07 PROCEDURE — 77030008463 HC STPLR SKN PROX J&J -B: Performed by: ORTHOPAEDIC SURGERY

## 2022-09-07 PROCEDURE — 77030007866 HC KT SPN ANES BBMI -B: Performed by: ANESTHESIOLOGY

## 2022-09-07 PROCEDURE — 77030006835 HC BLD SAW SAG STRY -B: Performed by: ORTHOPAEDIC SURGERY

## 2022-09-07 PROCEDURE — 74011000250 HC RX REV CODE- 250: Performed by: NURSE ANESTHETIST, CERTIFIED REGISTERED

## 2022-09-07 PROCEDURE — 27447 TOTAL KNEE ARTHROPLASTY: CPT | Performed by: PHYSICIAN ASSISTANT

## 2022-09-07 PROCEDURE — C9290 INJ, BUPIVACAINE LIPOSOME: HCPCS | Performed by: ORTHOPAEDIC SURGERY

## 2022-09-07 PROCEDURE — 76060000036 HC ANESTHESIA 2.5 TO 3 HR: Performed by: ORTHOPAEDIC SURGERY

## 2022-09-07 PROCEDURE — 77030028907 HC WRP KNEE WO BGS SOLM -B

## 2022-09-07 PROCEDURE — 77030010507 HC ADH SKN DERMBND J&J -B: Performed by: ORTHOPAEDIC SURGERY

## 2022-09-07 PROCEDURE — 74011250636 HC RX REV CODE- 250/636: Performed by: NURSE ANESTHETIST, CERTIFIED REGISTERED

## 2022-09-07 PROCEDURE — 74011000250 HC RX REV CODE- 250: Performed by: PHYSICIAN ASSISTANT

## 2022-09-07 PROCEDURE — 74011250637 HC RX REV CODE- 250/637: Performed by: PHYSICIAN ASSISTANT

## 2022-09-07 PROCEDURE — 74011250636 HC RX REV CODE- 250/636: Performed by: PHYSICIAN ASSISTANT

## 2022-09-07 PROCEDURE — 97165 OT EVAL LOW COMPLEX 30 MIN: CPT

## 2022-09-07 PROCEDURE — 97535 SELF CARE MNGMENT TRAINING: CPT

## 2022-09-07 PROCEDURE — 74011250636 HC RX REV CODE- 250/636: Performed by: ANESTHESIOLOGY

## 2022-09-07 PROCEDURE — 74011000250 HC RX REV CODE- 250: Performed by: ORTHOPAEDIC SURGERY

## 2022-09-07 PROCEDURE — 74011250636 HC RX REV CODE- 250/636

## 2022-09-07 PROCEDURE — C1776 JOINT DEVICE (IMPLANTABLE): HCPCS | Performed by: ORTHOPAEDIC SURGERY

## 2022-09-07 PROCEDURE — 27447 TOTAL KNEE ARTHROPLASTY: CPT | Performed by: ORTHOPAEDIC SURGERY

## 2022-09-07 PROCEDURE — 2709999900 HC NON-CHARGEABLE SUPPLY

## 2022-09-07 PROCEDURE — 77030006822 HC BLD SAW SAG BRSM -B: Performed by: ORTHOPAEDIC SURGERY

## 2022-09-07 PROCEDURE — 76010000172 HC OR TIME 2.5 TO 3 HR INTENSV-TIER 1: Performed by: ORTHOPAEDIC SURGERY

## 2022-09-07 PROCEDURE — 2709999900 HC NON-CHARGEABLE SUPPLY: Performed by: ORTHOPAEDIC SURGERY

## 2022-09-07 PROCEDURE — 74011000258 HC RX REV CODE- 258: Performed by: ORTHOPAEDIC SURGERY

## 2022-09-07 PROCEDURE — 77030010783 HC BOWL MX BN CEM J&J -B: Performed by: ORTHOPAEDIC SURGERY

## 2022-09-07 PROCEDURE — 74011000258 HC RX REV CODE- 258: Performed by: NURSE ANESTHETIST, CERTIFIED REGISTERED

## 2022-09-07 PROCEDURE — 82962 GLUCOSE BLOOD TEST: CPT

## 2022-09-07 PROCEDURE — 77030042556 HC PNCL CAUT -B: Performed by: ORTHOPAEDIC SURGERY

## 2022-09-07 PROCEDURE — 77030040922 HC BLNKT HYPOTHRM STRY -A

## 2022-09-07 PROCEDURE — 97530 THERAPEUTIC ACTIVITIES: CPT

## 2022-09-07 PROCEDURE — 77030002933 HC SUT MCRYL J&J -A: Performed by: ORTHOPAEDIC SURGERY

## 2022-09-07 PROCEDURE — 77030027138 HC INCENT SPIROMETER -A

## 2022-09-07 PROCEDURE — 77030035236 HC SUT PDS STRATFX BARB J&J -B: Performed by: ORTHOPAEDIC SURGERY

## 2022-09-07 PROCEDURE — C1713 ANCHOR/SCREW BN/BN,TIS/BN: HCPCS | Performed by: ORTHOPAEDIC SURGERY

## 2022-09-07 PROCEDURE — 77030020268 HC MISC GENERAL SUPPLY: Performed by: ORTHOPAEDIC SURGERY

## 2022-09-07 PROCEDURE — 77030005513 HC CATH URETH FOL11 MDII -B: Performed by: ORTHOPAEDIC SURGERY

## 2022-09-07 PROCEDURE — 77030031139 HC SUT VCRL2 J&J -A: Performed by: ORTHOPAEDIC SURGERY

## 2022-09-07 PROCEDURE — 97161 PT EVAL LOW COMPLEX 20 MIN: CPT

## 2022-09-07 PROCEDURE — 77030040361 HC SLV COMPR DVT MDII -B

## 2022-09-07 PROCEDURE — 76210000006 HC OR PH I REC 0.5 TO 1 HR: Performed by: ORTHOPAEDIC SURGERY

## 2022-09-07 PROCEDURE — 97116 GAIT TRAINING THERAPY: CPT

## 2022-09-07 PROCEDURE — 20985 CPTR-ASST DIR MS PX: CPT | Performed by: ORTHOPAEDIC SURGERY

## 2022-09-07 PROCEDURE — 77030003601 HC NDL NRV BLK BBMI -A

## 2022-09-07 PROCEDURE — 74011250636 HC RX REV CODE- 250/636: Performed by: ORTHOPAEDIC SURGERY

## 2022-09-07 DEVICE — ATTUNE KNEE SYSTEM TIBIAL BASE AFFIXIUM FIXED BEARING SIZE 5
Type: IMPLANTABLE DEVICE | Site: KNEE | Status: FUNCTIONAL
Brand: ATTUNE AFFIXIUM

## 2022-09-07 DEVICE — SMARTSET GHV GENTAMICIN HIGH VISCOSITY BONE CEMENT 40G
Type: IMPLANTABLE DEVICE | Site: KNEE | Status: FUNCTIONAL
Brand: SMARTSET

## 2022-09-07 DEVICE — ATTUNE PATELLA MEDIALIZED DOME 35MM CEMENTED AOX
Type: IMPLANTABLE DEVICE | Site: KNEE | Status: FUNCTIONAL
Brand: ATTUNE

## 2022-09-07 DEVICE — ATTUNE KNEE SYSTEM TIBIAL INSERT FIXED BEARING MEDIAL STABILIZED LEFT AOX 6, 6MM
Type: IMPLANTABLE DEVICE | Site: KNEE | Status: FUNCTIONAL
Brand: ATTUNE

## 2022-09-07 DEVICE — KNEE K2 TOT HEMI ADV CMTLS IMPL CAPPED SYNTHES: Type: IMPLANTABLE DEVICE | Status: FUNCTIONAL

## 2022-09-07 DEVICE — ATTUNE KNEE SYSTEM FEMORAL POROCOAT CRUCIATE RETAINING SIZE 6 LEFT CEMENTLESS
Type: IMPLANTABLE DEVICE | Site: KNEE | Status: FUNCTIONAL
Brand: ATTUNE

## 2022-09-07 RX ORDER — SODIUM CHLORIDE 0.9 % (FLUSH) 0.9 %
5-40 SYRINGE (ML) INJECTION EVERY 8 HOURS
Status: DISCONTINUED | OUTPATIENT
Start: 2022-09-07 | End: 2022-09-07 | Stop reason: HOSPADM

## 2022-09-07 RX ORDER — FACIAL-BODY WIPES
10 EACH TOPICAL DAILY PRN
Status: DISCONTINUED | OUTPATIENT
Start: 2022-09-09 | End: 2022-09-07 | Stop reason: HOSPADM

## 2022-09-07 RX ORDER — ONDANSETRON 2 MG/ML
4 INJECTION INTRAMUSCULAR; INTRAVENOUS
Status: DISCONTINUED | OUTPATIENT
Start: 2022-09-07 | End: 2022-09-07 | Stop reason: HOSPADM

## 2022-09-07 RX ORDER — POLYETHYLENE GLYCOL 3350 17 G/17G
17 POWDER, FOR SOLUTION ORAL DAILY
Qty: 14 PACKET | Refills: 0 | Status: SHIPPED | OUTPATIENT
Start: 2022-09-08 | End: 2022-09-22

## 2022-09-07 RX ORDER — FAMOTIDINE 20 MG/1
20 TABLET, FILM COATED ORAL 2 TIMES DAILY
Status: DISCONTINUED | OUTPATIENT
Start: 2022-09-07 | End: 2022-09-07 | Stop reason: HOSPADM

## 2022-09-07 RX ORDER — POLYETHYLENE GLYCOL 3350 17 G/17G
17 POWDER, FOR SOLUTION ORAL DAILY
Status: DISCONTINUED | OUTPATIENT
Start: 2022-09-08 | End: 2022-09-07 | Stop reason: HOSPADM

## 2022-09-07 RX ORDER — ACETAMINOPHEN 325 MG/1
650 TABLET ORAL EVERY 6 HOURS
Qty: 100 TABLET | Refills: 0 | Status: SHIPPED | OUTPATIENT
Start: 2022-09-07 | End: 2022-11-01

## 2022-09-07 RX ORDER — ACETAMINOPHEN 325 MG/1
650 TABLET ORAL ONCE
Status: DISCONTINUED | OUTPATIENT
Start: 2022-09-07 | End: 2022-09-07 | Stop reason: DRUGHIGH

## 2022-09-07 RX ORDER — MIDAZOLAM HYDROCHLORIDE 1 MG/ML
1 INJECTION, SOLUTION INTRAMUSCULAR; INTRAVENOUS AS NEEDED
Status: DISCONTINUED | OUTPATIENT
Start: 2022-09-07 | End: 2022-09-07 | Stop reason: HOSPADM

## 2022-09-07 RX ORDER — GLYCOPYRROLATE 0.2 MG/ML
INJECTION INTRAMUSCULAR; INTRAVENOUS AS NEEDED
Status: DISCONTINUED | OUTPATIENT
Start: 2022-09-07 | End: 2022-09-07 | Stop reason: HOSPADM

## 2022-09-07 RX ORDER — HYDROMORPHONE HYDROCHLORIDE 1 MG/ML
0.5 INJECTION, SOLUTION INTRAMUSCULAR; INTRAVENOUS; SUBCUTANEOUS
Status: DISCONTINUED | OUTPATIENT
Start: 2022-09-07 | End: 2022-09-07 | Stop reason: HOSPADM

## 2022-09-07 RX ORDER — OXYCODONE HYDROCHLORIDE 5 MG/1
5 TABLET ORAL
Qty: 40 TABLET | Refills: 0 | Status: SHIPPED | OUTPATIENT
Start: 2022-09-07 | End: 2022-09-14

## 2022-09-07 RX ORDER — OXYCODONE HYDROCHLORIDE 5 MG/1
5 TABLET ORAL
Status: DISCONTINUED | OUTPATIENT
Start: 2022-09-07 | End: 2022-09-07 | Stop reason: HOSPADM

## 2022-09-07 RX ORDER — PREGABALIN 75 MG/1
75 CAPSULE ORAL ONCE
Status: COMPLETED | OUTPATIENT
Start: 2022-09-07 | End: 2022-09-07

## 2022-09-07 RX ORDER — ROPIVACAINE HYDROCHLORIDE 5 MG/ML
30 INJECTION, SOLUTION EPIDURAL; INFILTRATION; PERINEURAL AS NEEDED
Status: DISCONTINUED | OUTPATIENT
Start: 2022-09-07 | End: 2022-09-07 | Stop reason: HOSPADM

## 2022-09-07 RX ORDER — DEXAMETHASONE SODIUM PHOSPHATE 4 MG/ML
INJECTION, SOLUTION INTRA-ARTICULAR; INTRALESIONAL; INTRAMUSCULAR; INTRAVENOUS; SOFT TISSUE AS NEEDED
Status: DISCONTINUED | OUTPATIENT
Start: 2022-09-07 | End: 2022-09-07 | Stop reason: HOSPADM

## 2022-09-07 RX ORDER — ASPIRIN 81 MG/1
81 TABLET ORAL 2 TIMES DAILY
Status: DISCONTINUED | OUTPATIENT
Start: 2022-09-07 | End: 2022-09-07 | Stop reason: HOSPADM

## 2022-09-07 RX ORDER — SODIUM CHLORIDE 9 MG/ML
125 INJECTION, SOLUTION INTRAVENOUS CONTINUOUS
Status: DISCONTINUED | OUTPATIENT
Start: 2022-09-07 | End: 2022-09-07 | Stop reason: HOSPADM

## 2022-09-07 RX ORDER — ASPIRIN 81 MG/1
81 TABLET ORAL 2 TIMES DAILY
Qty: 60 TABLET | Refills: 0 | Status: SHIPPED | OUTPATIENT
Start: 2022-09-07 | End: 2022-10-07

## 2022-09-07 RX ORDER — DEXMEDETOMIDINE HYDROCHLORIDE 100 UG/ML
INJECTION, SOLUTION INTRAVENOUS AS NEEDED
Status: DISCONTINUED | OUTPATIENT
Start: 2022-09-07 | End: 2022-09-07 | Stop reason: HOSPADM

## 2022-09-07 RX ORDER — QUETIAPINE FUMARATE 100 MG/1
750 TABLET, FILM COATED ORAL
Status: DISCONTINUED | OUTPATIENT
Start: 2022-09-07 | End: 2022-09-07 | Stop reason: HOSPADM

## 2022-09-07 RX ORDER — PROPOFOL 10 MG/ML
INJECTION, EMULSION INTRAVENOUS AS NEEDED
Status: DISCONTINUED | OUTPATIENT
Start: 2022-09-07 | End: 2022-09-07 | Stop reason: HOSPADM

## 2022-09-07 RX ORDER — PHENYLEPHRINE HCL IN 0.9% NACL 0.4MG/10ML
SYRINGE (ML) INTRAVENOUS
Status: DISCONTINUED | OUTPATIENT
Start: 2022-09-07 | End: 2022-09-07 | Stop reason: HOSPADM

## 2022-09-07 RX ORDER — SODIUM CHLORIDE 0.9 % (FLUSH) 0.9 %
5-40 SYRINGE (ML) INJECTION AS NEEDED
Status: DISCONTINUED | OUTPATIENT
Start: 2022-09-07 | End: 2022-09-07 | Stop reason: HOSPADM

## 2022-09-07 RX ORDER — NALOXONE HYDROCHLORIDE 0.4 MG/ML
0.4 INJECTION, SOLUTION INTRAMUSCULAR; INTRAVENOUS; SUBCUTANEOUS AS NEEDED
Status: DISCONTINUED | OUTPATIENT
Start: 2022-09-07 | End: 2022-09-07 | Stop reason: HOSPADM

## 2022-09-07 RX ORDER — NALOXONE HYDROCHLORIDE 4 MG/.1ML
SPRAY NASAL
Qty: 1 EACH | Refills: 0 | Status: SHIPPED | OUTPATIENT
Start: 2022-09-07 | End: 2022-10-06

## 2022-09-07 RX ORDER — FENTANYL CITRATE 50 UG/ML
50 INJECTION, SOLUTION INTRAMUSCULAR; INTRAVENOUS AS NEEDED
Status: DISCONTINUED | OUTPATIENT
Start: 2022-09-07 | End: 2022-09-07 | Stop reason: HOSPADM

## 2022-09-07 RX ORDER — ACETAMINOPHEN 500 MG
1000 TABLET ORAL ONCE
Status: COMPLETED | OUTPATIENT
Start: 2022-09-07 | End: 2022-09-07

## 2022-09-07 RX ORDER — CELECOXIB 200 MG/1
200 CAPSULE ORAL ONCE
Status: COMPLETED | OUTPATIENT
Start: 2022-09-07 | End: 2022-09-07

## 2022-09-07 RX ORDER — HYDROXYZINE HYDROCHLORIDE 10 MG/1
10 TABLET, FILM COATED ORAL
Status: DISCONTINUED | OUTPATIENT
Start: 2022-09-07 | End: 2022-09-07 | Stop reason: HOSPADM

## 2022-09-07 RX ORDER — AMOXICILLIN 250 MG
1 CAPSULE ORAL 2 TIMES DAILY
Status: DISCONTINUED | OUTPATIENT
Start: 2022-09-07 | End: 2022-09-07 | Stop reason: HOSPADM

## 2022-09-07 RX ORDER — KETOROLAC TROMETHAMINE 30 MG/ML
30 INJECTION, SOLUTION INTRAMUSCULAR; INTRAVENOUS EVERY 6 HOURS
Status: DISCONTINUED | OUTPATIENT
Start: 2022-09-07 | End: 2022-09-07 | Stop reason: HOSPADM

## 2022-09-07 RX ORDER — AMOXICILLIN 250 MG
1 CAPSULE ORAL 2 TIMES DAILY
Qty: 60 TABLET | Refills: 0 | Status: SHIPPED | OUTPATIENT
Start: 2022-09-07 | End: 2022-10-07

## 2022-09-07 RX ORDER — ROPIVACAINE HYDROCHLORIDE 5 MG/ML
INJECTION, SOLUTION EPIDURAL; INFILTRATION; PERINEURAL
Status: COMPLETED | OUTPATIENT
Start: 2022-09-07 | End: 2022-09-07

## 2022-09-07 RX ORDER — SODIUM CHLORIDE, SODIUM LACTATE, POTASSIUM CHLORIDE, CALCIUM CHLORIDE 600; 310; 30; 20 MG/100ML; MG/100ML; MG/100ML; MG/100ML
100 INJECTION, SOLUTION INTRAVENOUS CONTINUOUS
Status: DISCONTINUED | OUTPATIENT
Start: 2022-09-07 | End: 2022-09-07 | Stop reason: HOSPADM

## 2022-09-07 RX ORDER — ACETAMINOPHEN 325 MG/1
650 TABLET ORAL EVERY 6 HOURS
Status: DISCONTINUED | OUTPATIENT
Start: 2022-09-07 | End: 2022-09-07 | Stop reason: HOSPADM

## 2022-09-07 RX ORDER — ONDANSETRON 2 MG/ML
INJECTION INTRAMUSCULAR; INTRAVENOUS AS NEEDED
Status: DISCONTINUED | OUTPATIENT
Start: 2022-09-07 | End: 2022-09-07 | Stop reason: HOSPADM

## 2022-09-07 RX ORDER — MORPHINE SULFATE 2 MG/ML
2 INJECTION, SOLUTION INTRAMUSCULAR; INTRAVENOUS
Status: DISCONTINUED | OUTPATIENT
Start: 2022-09-07 | End: 2022-09-07 | Stop reason: HOSPADM

## 2022-09-07 RX ORDER — ONDANSETRON 2 MG/ML
4 INJECTION INTRAMUSCULAR; INTRAVENOUS AS NEEDED
Status: DISCONTINUED | OUTPATIENT
Start: 2022-09-07 | End: 2022-09-07 | Stop reason: HOSPADM

## 2022-09-07 RX ORDER — SODIUM CHLORIDE 9 MG/ML
25 INJECTION, SOLUTION INTRAVENOUS CONTINUOUS
Status: DISCONTINUED | OUTPATIENT
Start: 2022-09-07 | End: 2022-09-07 | Stop reason: HOSPADM

## 2022-09-07 RX ORDER — FENTANYL CITRATE 50 UG/ML
25 INJECTION, SOLUTION INTRAMUSCULAR; INTRAVENOUS
Status: DISCONTINUED | OUTPATIENT
Start: 2022-09-07 | End: 2022-09-07 | Stop reason: HOSPADM

## 2022-09-07 RX ORDER — FAMOTIDINE 20 MG/1
20 TABLET, FILM COATED ORAL 2 TIMES DAILY
Qty: 60 TABLET | Refills: 0 | Status: SHIPPED | OUTPATIENT
Start: 2022-09-07 | End: 2022-10-06

## 2022-09-07 RX ORDER — MIDAZOLAM HYDROCHLORIDE 1 MG/ML
0.5 INJECTION, SOLUTION INTRAMUSCULAR; INTRAVENOUS
Status: DISCONTINUED | OUTPATIENT
Start: 2022-09-07 | End: 2022-09-07 | Stop reason: HOSPADM

## 2022-09-07 RX ORDER — LIDOCAINE HYDROCHLORIDE 10 MG/ML
0.1 INJECTION, SOLUTION EPIDURAL; INFILTRATION; INTRACAUDAL; PERINEURAL AS NEEDED
Status: DISCONTINUED | OUTPATIENT
Start: 2022-09-07 | End: 2022-09-07 | Stop reason: HOSPADM

## 2022-09-07 RX ORDER — LORAZEPAM 1 MG/1
1 TABLET ORAL
Status: DISCONTINUED | OUTPATIENT
Start: 2022-09-07 | End: 2022-09-07 | Stop reason: HOSPADM

## 2022-09-07 RX ORDER — PROPOFOL 10 MG/ML
INJECTION, EMULSION INTRAVENOUS
Status: DISCONTINUED | OUTPATIENT
Start: 2022-09-07 | End: 2022-09-07 | Stop reason: HOSPADM

## 2022-09-07 RX ORDER — LIDOCAINE HYDROCHLORIDE 20 MG/ML
INJECTION, SOLUTION EPIDURAL; INFILTRATION; INTRACAUDAL; PERINEURAL AS NEEDED
Status: DISCONTINUED | OUTPATIENT
Start: 2022-09-07 | End: 2022-09-07 | Stop reason: HOSPADM

## 2022-09-07 RX ORDER — MELOXICAM 7.5 MG/1
7.5 TABLET ORAL DAILY
Qty: 30 TABLET | Refills: 0 | Status: SHIPPED | OUTPATIENT
Start: 2022-09-07 | End: 2022-10-07

## 2022-09-07 RX ORDER — DIPHENHYDRAMINE HYDROCHLORIDE 50 MG/ML
12.5 INJECTION, SOLUTION INTRAMUSCULAR; INTRAVENOUS AS NEEDED
Status: DISCONTINUED | OUTPATIENT
Start: 2022-09-07 | End: 2022-09-07 | Stop reason: HOSPADM

## 2022-09-07 RX ADMIN — PROPOFOL 50 MCG/KG/MIN: 10 INJECTION, EMULSION INTRAVENOUS at 08:47

## 2022-09-07 RX ADMIN — SODIUM CHLORIDE, POTASSIUM CHLORIDE, SODIUM LACTATE AND CALCIUM CHLORIDE 100 ML/HR: 600; 310; 30; 20 INJECTION, SOLUTION INTRAVENOUS at 08:36

## 2022-09-07 RX ADMIN — PROPOFOL 50 MG: 10 INJECTION, EMULSION INTRAVENOUS at 09:20

## 2022-09-07 RX ADMIN — PROPOFOL 75 MCG/KG/MIN: 10 INJECTION, EMULSION INTRAVENOUS at 08:57

## 2022-09-07 RX ADMIN — MIDAZOLAM HYDROCHLORIDE 1 MG: 1 INJECTION, SOLUTION INTRAMUSCULAR; INTRAVENOUS at 08:42

## 2022-09-07 RX ADMIN — ACETAMINOPHEN 1000 MG: 500 TABLET, FILM COATED ORAL at 08:23

## 2022-09-07 RX ADMIN — CELECOXIB 200 MG: 200 CAPSULE ORAL at 08:23

## 2022-09-07 RX ADMIN — Medication 40 MCG/MIN: at 08:57

## 2022-09-07 RX ADMIN — PROPOFOL 50 MG: 10 INJECTION, EMULSION INTRAVENOUS at 11:10

## 2022-09-07 RX ADMIN — FENTANYL CITRATE 25 MCG: 50 INJECTION, SOLUTION INTRAMUSCULAR; INTRAVENOUS at 12:45

## 2022-09-07 RX ADMIN — ACETAMINOPHEN 650 MG: 325 TABLET, FILM COATED ORAL at 14:53

## 2022-09-07 RX ADMIN — WATER 2 G: 1 INJECTION INTRAMUSCULAR; INTRAVENOUS; SUBCUTANEOUS at 09:03

## 2022-09-07 RX ADMIN — GLYCOPYRROLATE 0.2 MG: 0.2 INJECTION INTRAMUSCULAR; INTRAVENOUS at 09:13

## 2022-09-07 RX ADMIN — PROPOFOL 50 MG: 10 INJECTION, EMULSION INTRAVENOUS at 08:47

## 2022-09-07 RX ADMIN — FENTANYL CITRATE 50 MCG: 50 INJECTION, SOLUTION INTRAMUSCULAR; INTRAVENOUS at 08:39

## 2022-09-07 RX ADMIN — CEFAZOLIN 2 G: 1 INJECTION, POWDER, FOR SOLUTION INTRAMUSCULAR; INTRAVENOUS at 17:04

## 2022-09-07 RX ADMIN — DEXMEDETOMIDINE HYDROCHLORIDE 4 MCG: 100 INJECTION, SOLUTION, CONCENTRATE INTRAVENOUS at 09:13

## 2022-09-07 RX ADMIN — ONDANSETRON HYDROCHLORIDE 4 MG: 2 INJECTION, SOLUTION INTRAMUSCULAR; INTRAVENOUS at 09:06

## 2022-09-07 RX ADMIN — TRANEXAMIC ACID 1 G: 100 INJECTION, SOLUTION INTRAVENOUS at 09:03

## 2022-09-07 RX ADMIN — FENTANYL CITRATE 25 MCG: 50 INJECTION, SOLUTION INTRAMUSCULAR; INTRAVENOUS at 11:42

## 2022-09-07 RX ADMIN — MIDAZOLAM HYDROCHLORIDE 2 MG: 1 INJECTION, SOLUTION INTRAMUSCULAR; INTRAVENOUS at 08:38

## 2022-09-07 RX ADMIN — DEXAMETHASONE SODIUM PHOSPHATE 4 MG: 4 INJECTION, SOLUTION INTRAMUSCULAR; INTRAVENOUS at 09:06

## 2022-09-07 RX ADMIN — FENTANYL CITRATE 25 MCG: 50 INJECTION, SOLUTION INTRAMUSCULAR; INTRAVENOUS at 11:56

## 2022-09-07 RX ADMIN — PROPOFOL 100 MCG/KG/MIN: 10 INJECTION, EMULSION INTRAVENOUS at 09:03

## 2022-09-07 RX ADMIN — SODIUM CHLORIDE 125 ML/HR: 9 INJECTION, SOLUTION INTRAVENOUS at 13:00

## 2022-09-07 RX ADMIN — MEPIVACAINE HYDROCHLORIDE 50 MG: 15 INJECTION, SOLUTION EPIDURAL; INFILTRATION at 08:56

## 2022-09-07 RX ADMIN — LIDOCAINE HYDROCHLORIDE 40 MG: 20 INJECTION, SOLUTION EPIDURAL; INFILTRATION; INTRACAUDAL; PERINEURAL at 08:47

## 2022-09-07 RX ADMIN — ROPIVACAINE HYDROCHLORIDE 30 ML: 5 INJECTION, SOLUTION EPIDURAL; INFILTRATION; PERINEURAL at 08:48

## 2022-09-07 RX ADMIN — PREGABALIN 75 MG: 75 CAPSULE ORAL at 08:24

## 2022-09-07 RX ADMIN — DEXMEDETOMIDINE HYDROCHLORIDE 4 MCG: 100 INJECTION, SOLUTION, CONCENTRATE INTRAVENOUS at 09:31

## 2022-09-07 RX ADMIN — PROPOFOL 50 MG: 10 INJECTION, EMULSION INTRAVENOUS at 11:13

## 2022-09-07 NOTE — DISCHARGE INSTRUCTIONS
Discharge Instructions Knee Replacement  Dr. Rahul Varner  Patient Name  Blanca Baker  Date of procedure  9/7/2022    Procedure  Procedure(s):  LEFT TOTAL KNEE ARTHROPLASTY (VELYS)  Surgeon  Surgeon(s) and Role:     * Rosie Simmons MD - Primary  Date of discharge: [unfilled]  PCP: @PCP@    Follow up care  Follow up visit with Dr. Rahul Varner in 4 weeks. Call [phone] Dav Judge to make an appointment. If Home Health has been arranged for you, they will call you to arrange dates/times for visits. Call them if you do not hear from them within 24 hours after you go home. Activity at home  Take a short walk every hour; except at night when sleeping. Do your Home Exercise Program 3 times every day. After exercising lie down and elevate your leg on pillows for 15-30 minutes to decrease swelling. Refer to your patient notebook for more information. Bathing and caring for your incision  You may take a shower with your waterproof dressing on your knee. The waterproof dressing is to stay on your knee for 7 days. On the 7th day have someone gently peel the dressing off by lifting the edge and stretching it to break the seal.  You may then leave your incision open to air unless you see drainage from your knee. Preventing blood clots  Take Aspirin 81mg twice each day for one month (30 days) following surgery. Call Dr. Rahul Varner for signs of a blood clot in your leg: calf pain, tenderness, redness, swelling of lower leg   Preventing lung congestion  Use your incentive spirometer 4 times a day; do 10 repetitions each time  Remember to keep the small blue ball between the two arrows when taking a slow, deep breath   Pain Management  Get up and walk a short distance to relieve pain and stiffness. Place ice wrap on your knee except when you are walking. The gel ice packs should be changed about every 4 hours. Elevate your leg on pillows for 15-30 minutes.    Pain Medications  Take Tylenol 650mg (take two 325mg tablets) every 6 hours for the next 4 weeks. Take Meloxicam (Mobic) every day as prescribed to decrease swelling and inflammation. Do not take Meloxicam if you have had stomach ulcers. Take Famotidine (Pepcid) 20mg twice a day to prevent an upset stomach (if taking Aspirin and/or Meloxicam). If needed, take Oxycodone 5mg (narcotic pain pill). Take Oxycodone only if needed and stop once your pain is tolerable. Take all medications with a small amount of food. As your pain decreases, take the narcotics less often or take ½ of a pill. Call Dr. Dolores Márquez if you have side effects from your narcotic pain medication: itching, drowsiness, dizziness, upset stomach, dry mouth, constipation or if you medication is not relieving your pain. Diet after surgery  You may resume your normal diet. Include vegetables, fruit, whole grains, lean meats, and low-fat dairy products. Eat food high in fiber. Drink plenty of fluids, including 8 cups of water daily. Take a stool softener (Senokot-s or Colace) to prevent constipation. If constipation occurs you may take a laxative (Milk of Magnesia, Dulcolax tablets). Avoid after surgery  Do not take any over-the-counter medication for pain except Tylenol  Do not take more than 3000mg (3 Grams) of Tylenol in 24 hours  Do not drink alcoholic beverages  Do not smoke  Do not drive until seen for follow up appointment  Do not place frozen gel pack directly on your skin. It can cause frostbite. Do not take a tub bath, swim or get in a hot tub for 8 weeks  Prevention of falls and safety at home  Set up an area where you can rest comfortably leaving space around furniture to allow you to walk with your walker. Keep stairs, hallways and bathrooms well lit; especially at night. Arrange for care for your pets  Keep your home free of clutter.    Call Dr. Dolores Márquez at [phone] for:  Pain that is not relieved by pain medication, ice and activity  Side effects of medications  Increased/spread of bruising  Warning signs of infection:  persistent fever greater than 100 degrees  shaking or chills  increased redness, tenderness, swelling or drainage from incision  increased pain during activity or rest  Warning signs of a blood clot in your leg:  increased pain in your calf  tenderness or redness  increased swelling or knee, calf, ankle or foot    Call [phone] after 5pm or on a weekend.  The on call physician will return your phone call  Call your Primary Care Doctor for:   Concerns about your medical conditions such as diabetes, high blood pressure, asthma, congestive heart failure  Blood sugars greater than 180  Persistent headache or dizziness  Coughing or congestion  Constipation or diarrhea  Burning when you go to the bathroom  Abnormal heart rate (fast or  slow)      Call 911 and go to the nearest hospital for:   Sudden increased shortness of breath  Sudden onset of chest pain  Difficulty breathing  Localized chest pain with coughing or taking a deep breath

## 2022-09-07 NOTE — ANESTHESIA PROCEDURE NOTES
Peripheral Block    Start time: 9/7/2022 8:43 AM  End time: 9/7/2022 8:48 AM  Performed by: Farzana Villaseñor MD  Authorized by: Farzana Villaseñor MD       Pre-procedure: Indications: at surgeon's request and post-op pain management    Preanesthetic Checklist: patient identified, risks and benefits discussed, site marked, timeout performed, anesthesia consent given and patient being monitored      Block Type:   Block Type:   Adductor canal  Laterality:  Left  Monitoring:  Standard ASA monitoring, responsive to questions, oxygen, continuous pulse ox, frequent vital sign checks and heart rate  Injection Technique:  Single shot  Procedures: ultrasound guided    Patient Position: supine  Prep: chlorhexidine    Location:  Mid thigh  Needle Type:  Stimuplex  Needle Gauge:  22 G  Needle Localization:  Ultrasound guidance  Medication Injected:  Ropivacaine (PF) (NAROPIN)(0.5%) 5 mg/mL injection - Peripheral Nerve Block   30 mL - 9/7/2022 8:48:00 AM  Med Admin Time: 9/7/2022 8:49 AM    Assessment:  Number of attempts:  1  Injection Assessment:  Incremental injection every 5 mL, local visualized surrounding nerve on ultrasound, negative aspiration for blood, no intravascular symptoms, no paresthesia and ultrasound image on chart  Patient tolerance:  Patient tolerated the procedure well with no immediate complications

## 2022-09-07 NOTE — PROGRESS NOTES
Ortho NP Note    POD# 0  s/p LEFT TOTAL KNEE ARTHROPLASTY (VELYS)     Pt resting in bed. States she is comfortable at present, denies need for pain medication. Per patient, has used oxycodone in the past without side effects. Patient states that she would like to discharge to home today but aware of pending PT evaluation, need to void. Patient has had something to eat/drink. No nausea/vomiting. Denies CP, SOB. No dizziness, lightheadedness. VSS Afebrile. Visit Vitals  BP (!) 147/74 (BP 1 Location: Left upper arm, BP Patient Position: At rest)   Pulse 62   Temp 98.1 °F (36.7 °C)   Resp 17   Ht 5' 4.25\" (1.632 m)   Wt 71 kg (156 lb 8.4 oz)   SpO2 97%   BMI 26.66 kg/m²       Most Recent Labs:   Lab Results   Component Value Date/Time    HGB 12.9 08/31/2022 09:50 AM    Hemoglobin A1c 5.0 08/31/2022 09:50 AM       Body mass index is 26.66 kg/m². Reference: BMI greater than 30 is classified as obesity and greater than 40 is classified as morbid obesity. STOP BANG Score: 1    Voiding status: remains due to void     Ace wrap + gauze to left knee c.d.i. Cryotherapy in place over incision. Bilateral LEs warm, dry. 1+ DP pulses  Sensation and motor intact. DF/PF/EHL 5/5. Foot Pumps for mechanical DVT proph    Plan:  1) PT: starting today, WBAT  2) Demetra-op Antibiotics Ancef  3) Pain:  Received tylenol, Celebrex, Lyrica in preop. Perioperative anesthesia: Spinal, adductor canal block. Post operative analgesia includes scheduled tylenol, toradol and PRN oxycodone, IV dilaudid. Discussed scheduled vs as needed medications and pain expectations POD 0 vs 1.    4) DVT Prophylaxis:  Aspirin 81 mg PO BID. Encouraged early mobilization, bed exercises, and SCD use. 5) GI Prophylaxis: Pepcid   6) Post operative care: Encouraged IS, OBR. Follow up in 3 weeks with Dr. Baldomero Tyler. Aquacel to remain in place x 7 days. Home health already arranged by , [address] [place] [address].     7) Discharge plans: to home with family support. DME: Has walker.   Rx: IRLANDA Fuentes NP

## 2022-09-07 NOTE — ANESTHESIA POSTPROCEDURE EVALUATION
Procedure(s):  LEFT TOTAL KNEE ARTHROPLASTY (VELYS). spinal, MAC    Anesthesia Post Evaluation        Patient location during evaluation: PACU  Note status: Adequate. Level of consciousness: responsive to verbal stimuli and sleepy but conscious  Pain management: satisfactory to patient  Airway patency: patent  Anesthetic complications: no  Cardiovascular status: acceptable  Respiratory status: acceptable  Hydration status: acceptable  Comments: +Post-Anesthesia Evaluation and Assessment    Patient: Marco Pak MRN: [id number]  SSN: [id number]   YOB: 1965  Age: 59 y.o. Sex: female          Cardiovascular Function/Vital Signs    /79 (BP 1 Location: Left upper arm, BP Patient Position: At rest;Sitting)   Pulse 75   Temp 36.4 °C (97.5 °F)   Resp 16   Ht 5' 4.25\" (1.632 m)   Wt 71 kg (156 lb 8.4 oz)   SpO2 97%   BMI 26.66 kg/m²     Patient is status post Procedure(s):  LEFT TOTAL KNEE ARTHROPLASTY (VELYS). Nausea/Vomiting: Controlled. Postoperative hydration reviewed and adequate. Pain:  Pain Scale 1: Numeric (0 - 10) (09/07/22 1326)  Pain Intensity 1: 3 (09/07/22 1326)   Managed. Neurological Status:   Neuro (WDL): Within Defined Limits (09/07/22 1215)   At baseline. Mental Status and Level of Consciousness: Arousable. Pulmonary Status:   O2 Device: None (Room air) (09/07/22 1326)   Adequate oxygenation and airway patent. Complications related to anesthesia: None    Post-anesthesia assessment completed. No concerns. I have evaluated the patient and the patient is stable and ready to be discharged from PACU . Signed By: Adron Gowers, MD    9/7/2022        INITIAL Post-op Vital signs:   Vitals Value Taken Time   /88 09/07/22 1300   Temp 36.4 °C (97.5 °F) 09/07/22 1215   Pulse 68 09/07/22 1307   Resp 17 09/07/22 1307   SpO2 96 % 09/07/22 1307   Vitals shown include unvalidated device data.

## 2022-09-07 NOTE — PROGRESS NOTES
1305: TRANSFER - OUT REPORT:    Verbal report given to Guido Amaya RN(name) on [employer]  being transferred to 577(unit) for routine post - op       Report consisted of patients Situation, Background, Assessment and   Recommendations(SBAR). Time Pre op antibiotic given:0903  Anesthesia Stop time: 7283    Information from the following report(s) SBAR, Kardex, OR Summary, Intake/Output, and MAR was reviewed with the receiving nurse. Opportunity for questions and clarification was provided. Is the patient on 02? NO        Is the patient on a monitor? NO    Is the nurse transporting with the patient? NO    Surgical Waiting Area notified of patient's transfer from PACU? YES      The following personal items collected during your admission accompanied patient upon transfer:   Dental Appliance: Dental Appliances: None  Vision: Visual Aid: None  Hearing Aid:    Jewelry: Jewelry: Earrings, Ring, With patient (2 RINGS ON LEFT THUMB; RIGHT & LEFT EAR PIERCINGS)  Clothing: Clothing: Other (comment) (clothing and shoes returned in pacu)  Other Valuables:  Other Valuables: None  Valuables sent to safe:

## 2022-09-07 NOTE — PROGRESS NOTES
OCCUPATIONAL THERAPY EVALUATION/DISCHARGE  Patient: Adalgisa Ward (42 y.o. female)  Date: 9/7/2022  Primary Diagnosis: Osteoarthritis of left knee, unspecified osteoarthritis type [M17.12]  Procedure(s) (LRB):  LEFT TOTAL KNEE ARTHROPLASTY (VELYS) (Left) Day of Surgery   Precautions:   Fall, WBAT    ASSESSMENT  Based on the objective data described below, the patient presents with plans for discharge POD 0 from TKR. Pt has significant other here for training and education. She progressed to and from the bathroom with supervision using RW for voiding her bladder. She completed dressing activities from EOB with supervision. Discussed IADL activities with SO and patient. Pt plans to discharge home today and has cleared OT. Pt is cleared for discharge home. Recommend home with support from family/friends as needed with basic ADL and IADL activities. Pt will require 24/7 supervision and assistance at home to maintain safety with all functional activities including but not limited to bathing, dressing, and toileting. Current Level of Function (ADLs/self-care): supervision    Functional Outcome Measure: The patient scored 80 on the Barthel Index outcome measure which is indicative of 20% impairment with ADL activities. Other factors to consider for discharge: debility, pain     PLAN :  Recommend with staff: OOB to chair TID for meals. Recommend progression to and from bathroom with use of walker and gait belt for toileting. Recommendation for discharge: (in order for the patient to meet his/her long term goals)  No skilled occupational therapy/ follow up rehabilitation needs identified at this time. This discharge recommendation:  Has been made in collaboration with the attending provider and/or case management    IF patient discharges home will need the following DME: none       SUBJECTIVE:   Patient stated I am feeling alright and I am ready to go home.     OBJECTIVE DATA SUMMARY: HISTORY:   Past Medical History:   Diagnosis Date    Aggressive outburst     Anxiety disorder     Arthritis 2008    Knees and hands    Artificial menopause     Bipolar affective disorder ([facility] [address] 01/01/2007    Endometriosis     Leukopenia     Psychiatric disorder [id number]    Bipolar episodes    [facility]     Substance abuse ([facility]      Past Surgical History:   Procedure Laterality Date    HX BREAST BIOPSY Right     Benign    HX GI      HX ORTHOPAEDIC  03/2018    REPAIR BROKEN FEMUR/HIP    HX SALPINGO-OOPHORECTOMY Bilateral 2010       Prior Level of Function/Environment/Context: pt is independent at baseline. She is on disability and does not work. Expanded or extensive additional review of patient history:   Home Situation  Home Environment: Private residence  # Steps to Enter: 5  Rails to Enter: Yes  One/Two Story Residence: One story  Living Alone: No  Support Systems: Spouse/Significant Other  Patient Expects to be Discharged to[de-identified] Home  Current DME Used/Available at Home: Cane, straight, Walker, rolling, Shower chair, Raised toilet seat  Tub or Shower Type: Tub/Shower combination    Hand dominance: Right    EXAMINATION OF PERFORMANCE DEFICITS:  Cognitive/Behavioral Status:  Neurologic State: Alert  Orientation Level: Oriented X4  Cognition: Appropriate safety awareness  Perception: Appears intact  Perseveration: No perseveration noted  Safety/Judgement: Good awareness of safety precautions    Skin: see nursing notes    Edema: none noted    Hearing: Auditory  Auditory Impairment: None    Vision/Perceptual:                           Acuity: Within Defined Limits         Range of Motion:    AROM: Within functional limits                         Strength:    Strength: Within functional limits                Coordination:  Coordination: Within functional limits  Fine Motor Skills-Upper: Right Intact; Left Intact    Gross Motor Skills-Upper: Right Intact; Left Intact    Tone & Sensation:    Tone: Normal  Sensation: Intact                      Balance:  Sitting: Intact  Standing: Intact; With support  Standing - Static: Good;Occasional  Standing - Dynamic : Good    Functional Mobility and Transfers for ADLs:  Bed Mobility:  Supine to Sit: Additional time;Supervision  Sit to Supine: Supervision    Transfers:  Sit to Stand: Supervision  Stand to Sit: Supervision  Bed to Chair: Supervision  Bathroom Mobility: Supervision/set up  Toilet Transfer : Supervision    ADL Assessment:  Feeding: Independent    Oral Facial Hygiene/Grooming: Setup    Bathing: Additional time;Supervision         Upper Body Dressing: Additional time;Supervision    Lower Body Dressing: Additional time;Supervision    Toileting: Supervision                ADL Intervention and task modifications:     Dressing training following TKR:  Pt participated with ADL routine while sitting EOB. Pt provided setup for sponge bathing and given instructions for safety and energy conservation with bathing activities. Pt provided training and education for donning clothing over surgical leg first.  Pt completed donning underpants with supervision, pants with supervision, socks with supervision, and shoes with supervision. Pt did well with eduction and training but will benefit from reinforcement of education provided. Following intervention, pt left supine in bed with ice to her knee. She completed toileting with supervision and overall did well with all activities. .                                       Cognitive Retraining  Safety/Judgement: Good awareness of safety precautions    Functional Measure:    Barthel Index:  Bathin  Bladder: 10  Bowels: 10  Groomin  Dressing: 10  Feeding: 10  Mobility: 10  Stairs: 5  Toilet Use: 10  Transfer (Bed to Chair and Back): 10  Total: 80/100      The Barthel ADL Index: Guidelines  1. The index should be used as a record of what a patient does, not as a record of what a patient could do.   2. The main aim is to establish degree of independence from any help, physical or verbal, however minor and for whatever reason. 3. The need for supervision renders the patient not independent. 4. A patient's performance should be established using the best available evidence. Asking the patient, friends/relatives and nurses are the usual sources, but direct observation and common sense are also important. However direct testing is not needed. 5. Usually the patient's performance over the preceding 24-48 hours is important, but occasionally longer periods will be relevant. 6. Middle categories imply that the patient supplies over 50 per cent of the effort. 7. Use of aids to be independent is allowed. Score Interpretation (from [facility])    Independent   60-79 Minimally independent   40-59 Partially dependent   20-39 Very dependent   <20 Totally dependent     -Corwin Kasper., Barthel, DGiniW. (1965). Functional evaluation: the Barthel Index. [address] [facility] ([address] (1997). The Barthel activities of daily living index: self-reporting versus actual performance in the old (> or = 75 years). Journal [address] 45(7), [facility], JGLADIS, Vitor Collier., Ivory Sagastume. (1999). Measuring the change in disability after inpatient rehabilitation; comparison of the responsiveness of the Barthel Index and Functional [place] Measure. Journal of Neurology, Neurosurgery, and Psychiatry, 66(4), 516-822. SOSA Neal.JANIYA, FELISHA Kellogg, & Alf Carrion, M.A. (2004) Assessment of post-stroke quality of life in cost-effectiveness studies: The usefulness of the Barthel Index and the EuroQoL-5D.  Quality of Life Research, 15, 873-45        Occupational Therapy Evaluation Charge Determination   History Examination Decision-Making   LOW Complexity : Brief history review  LOW Complexity : 1-3 performance deficits relating to physical, cognitive , or psychosocial skils that result in activity limitations and / or participation restrictions  LOW Complexity : No comorbidities that affect functional and no verbal or physical assistance needed to complete eval tasks       Based on the above components, the patient evaluation is determined to be of the following complexity level: LOW   Pain Rating:  No pain at this time, block still in place    Activity Tolerance:   Good    After treatment patient left in no apparent distress:    Supine in bed, Heels elevated for pressure relief, Call bell within reach, and Caregiver / family present    COMMUNICATION/EDUCATION:   The patients plan of care was discussed with: Physical therapist and Registered nurse.      Thank you for this referral.  Jennifer Sung OT  Time Calculation: 22 mins

## 2022-09-07 NOTE — PROGRESS NOTES
PHYSICAL THERAPY EVALUATION/DISCHARGE  Patient: Kladuia Cano (2 y.o. female)  Date: 9/7/2022  Primary Diagnosis: Osteoarthritis of left knee, unspecified osteoarthritis type [M17.12]  Procedure(s) (LRB):  LEFT TOTAL KNEE ARTHROPLASTY (VELYS) (Left) Day of Surgery   Precautions:   Fall, WBAT      ASSESSMENT  Based on the objective data described below, the patient presents with decreased mobility compared to baseline after L TKA, POD0. She has very good pain control and VSS during activity. She was able to mobilize out of bed with supervision for safety and ambulated with RW with contact guard assist.  She was trained in stair management and was able to asc/desc 4 steps with railing and cane with good overall balance. Reviewed activity recommendations and restrictions, use of ice and positioning for pain management and beginning exercises with patient and significant other. Discussed with NP regarding potential fast track status, and she is clear for D/C home with HHPT follow from a PT standpoint. Functional Outcome Measure: The patient scored 80 on the Barthel Index outcome measure which is indicative of minimally impaired functional independence. Other factors to consider for discharge: none     Further skilled acute physical therapy is not indicated at this time. PLAN :  Recommendation for discharge: (in order for the patient to meet his/her long term goals)  Physical therapy at least 2 days/week in the home AND ensure assist and/or supervision for safety with all mobility for the first 24-48 hours    This discharge recommendation:  Has been made in collaboration with the attending provider and/or case management    IF patient discharges home will need the following DME: patient owns DME required for discharge and RW adjusted to her height       SUBJECTIVE:   Patient stated I feel good.     OBJECTIVE DATA SUMMARY:   HISTORY:    Past Medical History:   Diagnosis Date    Aggressive outburst Anxiety disorder     Arthritis 2008    Knees and hands    Artificial menopause     Bipolar affective disorder ([facility] 01/01/2007    Endometriosis     Leukopenia     Psychiatric disorder [id number]    Bipolar episodes    Psychotic disorder ([facility]     Substance abuse ([facility]      Past Surgical History:   Procedure Laterality Date    HX BREAST BIOPSY Right     Benign    HX GI      HX ORTHOPAEDIC  03/2018    REPAIR BROKEN FEMUR/HIP    HX SALPINGO-OOPHORECTOMY Bilateral 2010       Prior level of function: independent and active  Personal factors and/or comorbidities impacting plan of care: as noted above    Home Situation  # Steps to Enter: 5  Rails to Enter: Yes  One/Two Story Residence: One story  Living Alone: No  Support Systems: Spouse/Significant Other  Current DME Used/Available at Home: [address], [address], straight, Walker, rolling, Shower chair, Raised toilet seat    EXAMINATION/PRESENTATION/DECISION MAKING:   Critical Behavior:  Neurologic State: Alert  Orientation Level: Oriented X4  Cognition: Appropriate for age attention/concentration     Hearing:     Skin:  post op ace in place with no drainage noted  Edema: none  Range Of Motion:  AROM: Within functional limits (L knee 0-80 with ace in place)                       Strength:    Strength: Within functional limits (LLE 3/5 post op)                    Tone & Sensation:   Tone: Normal              Sensation:  (intact to light touch)               Coordination:  Coordination: Within functional limits  Vision:      Functional Mobility:  Bed Mobility:     Supine to Sit: Supervision  Sit to Supine: Supervision     Transfers:  Sit to Stand: Stand-by assistance; Adaptive equipment; Additional time  Stand to Sit: Stand-by assistance; Adaptive equipment; Additional time        Bed to Chair: Stand-by assistance; Adaptive equipment; Additional time              Balance:   Sitting: Intact; Without support  Standing: Impaired; With support (with hands on RW)  Standing - Static: Occasional;Good  Standing - Dynamic : Good;Occasional  Ambulation/Gait Training:  Distance (ft): 70 Feet (ft)  Assistive Device: Gait belt;Walker, rolling  Ambulation - Level of Assistance: Contact guard assistance; Adaptive equipment; Additional time        Gait Abnormalities: Decreased step clearance; Step to gait  Right Side Weight Bearing: Full  Left Side Weight Bearing: As tolerated  Base of Support: Shift to right  Stance: Left decreased  Speed/Mirta: Pace decreased (<100 feet/min)  Step Length: Right shortened;Left shortened  Swing Pattern: Left asymmetrical                  Stairs:  Number of Stairs Trained: 4  Stairs - Level of Assistance: Contact guard assistance; Additional time; Adaptive equipment   Rail Use: Left  (plus cane)    Therapeutic Exercises: Ankle pumps, quad sets, heel slides reviewed    Functional Measure:  Barthel Index:    Bathin  Bladder: 10  Bowels: 10  Groomin  Dressing: 10  Feeding: 10  Mobility: 10  Stairs: 5  Toilet Use: 10  Transfer (Bed to Chair and Back): 10  Total: 80/100       The Barthel ADL Index: Guidelines  1. The index should be used as a record of what a patient does, not as a record of what a patient could do. 2. The main aim is to establish degree of independence from any help, physical or verbal, however minor and for whatever reason. 3. The need for supervision renders the patient not independent. 4. A patient's performance should be established using the best available evidence. Asking the patient, friends/relatives and nurses are the usual sources, but direct observation and common sense are also important. However direct testing is not needed. 5. Usually the patient's performance over the preceding 24-48 hours is important, but occasionally longer periods will be relevant. 6. Middle categories imply that the patient supplies over 50 per cent of the effort. 7. Use of aids to be independent is allowed.     Score Interpretation (from [address] [address])    Independent   60-79 Minimally independent   40-59 Partially dependent   20-39 Very dependent   <20 Totally dependent     -Corwin Kasper., Barthel, D.W. (1965). Functional evaluation: the Barthel Index. [address] ([address] (1997). The Barthel activities of daily living index: self-reporting versus actual performance in the old (> or = 75 years). Journal of [address] [place] [address] 45(7), [facility], COLETTE, Avery Urena., Arnulfo Ochoa (1999). Measuring the change in disability after inpatient rehabilitation; comparison of the responsiveness of the Barthel Index and Functional [place] Measure. Journal of Neurology, Neurosurgery, and Psychiatry, 66(4), 697-633. MARIELLE Treviño, FELISHA Kellogg, & Addison Strong MGiniA. (2004) Assessment of post-stroke quality of life in cost-effectiveness studies: The usefulness of the Barthel Index and the EuroQoL-5D. Quality of Life Research, 15, 913-30           Physical Therapy Evaluation Charge Determination   History Examination Presentation Decision-Making   HIGH Complexity :3+ comorbidities / personal factors will impact the outcome/ POC  MEDIUM Complexity : 3 Standardized tests and measures addressing body structure, function, activity limitation and / or participation in recreation  LOW Complexity : Stable, uncomplicated  LOW Complexity : FOTO score of       Based on the above components, the patient evaluation is determined to be of the following complexity level: LOW     Pain Rating:  No complaints of pain at this time    Activity Tolerance:   Good      After treatment patient left in no apparent distress:   Supine in bed, Call bell within reach, Caregiver / family present, and Side rails x 3    COMMUNICATION/EDUCATION:   The patients plan of care was discussed with: Occupational therapist, Registered nurse, Case management, and NP .      Fall prevention education was provided and the patient/caregiver indicated understanding., Patient/family have participated as able in goal setting and plan of care. , and Patient/family agree to work toward stated goals and plan of care.     Thank you for this referral.  Nithya Lyon, PT   Time Calculation: 40 mins

## 2022-09-07 NOTE — ANESTHESIA PROCEDURE NOTES
Spinal Block    Start time: 9/7/2022 8:49 AM  End time: 9/7/2022 8:56 AM  Performed by: Donna Collier CRNA  Authorized by: Ramu Lopez MD     Pre-procedure:   Indications: primary anesthetic  Preanesthetic Checklist: patient identified, risks and benefits discussed, anesthesia consent, site marked, patient being monitored, timeout performed and fire risk safety assessment completed and verbalized      Spinal Block:   Patient Position:  Seated  Prep Region:  Lumbar  Prep: Betadine and DuraPrep      Location:  L4-5  Technique:  Single shot      Med Admin Time: 9/7/2022 8:56 AM    Needle:   Needle Type:  Pencil-tip  Needle Gauge:  24 G  Attempts:  1      Events: CSF confirmed, no blood with aspiration and no paresthesia        Assessment:  Insertion:  Uncomplicated  Patient tolerance:  Patient tolerated the procedure well with no immediate complications

## 2022-09-07 NOTE — PROGRESS NOTES
Medicare Outpatient Observation Notice (MOON)/ [place] Outpatient Observation Notice (Robins Scarce) provided to patient/representative with verbal explanation of the notice. Time allotted for questions regarding the notice. Patient /representative provided a completed copy of the MOON/VOON notice. Copy placed on bedside chart. The patient is a fast track and plans to discharge today with [facility] home care. Family to transport. The patient has a RW.      Ray Seaman RN/CRM

## 2022-09-07 NOTE — PROGRESS NOTES
Bedside shift change report given to Rolan Bello  (oncoming nurse) by Karlee Ford  (offgoing nurse). Report included the following information SBAR, Kardex, Intake/Output, and MAR.

## 2022-09-07 NOTE — H&P
Date of Surgery Update:  Kinsey Castelan was seen and examined. History and physical has been reviewed. The patient has been examined. There have been no significant clinical changes since the completion of the originally dated History and Physical.  Patient identified by surgeon; surgical site was confirmed by patient and surgeon.     Signed By: Drea Lang MD     September 7, 2022 7:32 AM

## 2022-09-07 NOTE — ROUTINE PROCESS
Patient: Rosie Fine MRN: [id number]  SSN: [id number]   YOB: 1965  Age: 59 y.o. Sex: female     Patient is status post Procedure(s):  LEFT TOTAL KNEE ARTHROPLASTY (VELYS). Surgeon(s) and Role:     * Mitcheal Brittle, MD - Primary    Local/Dose/Irrigation:  see MAR                  Peripheral IV 09/07/22 Right Hand (Active)   Site Assessment Clean, dry, & intact 09/07/22 0838   Phlebitis Assessment 0 09/07/22 0838   Infiltration Assessment 0 09/07/22 0838   Dressing Status Clean, dry, & intact; New 09/07/22 0838   Dressing Type Transparent;Tape 09/07/22 0838   Hub Color/Line Status Green 09/07/22 0838                           Dressing/Packing:  Incision 09/07/22 Knee Left-Dressing/Treatment: Other (Comment) (dermabond, aquacel, cast padding, ace; bandaid on navigation port site) (09/07/22 1028)

## 2022-09-07 NOTE — OP NOTES
Name: Aimee Linder  MRN:  [id number]  : 1965  Age:  59 y.o. Surgery Date: 2022      OPERATIVE REPORT - LEFT TOTAL KNEE REPLACEMENT    PREOPERATIVE DIAGNOSIS: Osteoarthritis, left knee. POSTOPERATIVE DIAGNOSIS: Osteoarthritis, left knee. PROCEDURE PERFORMED: Computer assisted LEFT total knee arthroplasty [employer] Robot    SURGEON: Kelly Alvarez MD    FIRST ASSISTANT:  Leela Lopez PA-C    ANESTHESIA: Spinal    PRE-OP ANTIBIOTIC: Ancef 2g    COMPLICATIONS: None. ESTIMATED BLOOD LOSS: 100 mL. SPECIMENS REMOVED: None. COMPONENTS IMPLANTED:   Implant Name Type Inv. Item Serial No.  Lot No. LRB No. Used Action   CEMENT BNE 40GM FULL DOSE PMMA W/ GENT HI VISC RADPQ LNG - SN/A  CEMENT BNE 40GM FULL DOSE PMMA W/ GENT HI VISC RADPQ LNG N/A [employer] [place] [facility] [facility] [id number] Left 1 Implanted   ATTUNE FEM POR CR LT SZ 6 - SN/A  ATTUNE FEM POR CR LT SZ 6 N/A [employer] [place] [employer] [facility] [id number] Left 1 Implanted   PAT BRIDGET DOME MEDIAL 35MM -- ATTUNE - SN/A  PAT BRIDGET DOME MEDIAL 35MM -- ATTUNE N/A [employer] [place] [employer] [facility] [id number] Left 1 Implanted   BEARING TIB FIX 5 KNEE BASE POROUS ATTUNE AFFIXIUM - SN/A  BEARING TIB FIX 5 KNEE BASE POROUS ATTUNE AFFIXIUM N/A [employer] [place] [employer] [facility] [facility] [id number] Left 1 Implanted   INSERT TIB FIX BEAR 6 6 MM LT MEDL KNEE STBL AOX ATTUNE - SN/A  INSERT TIB FIX BEAR 6 6 MM LT MEDL KNEE STBL AOX ATTUNE N/A [facility] [employer] [facility] [id number] Left 1 Implanted       INDICATIONS: The patient is an 59 yrs female with progressive debilitating left knee pain due to severe osteoarthritis. Symptoms have progressed despite comprehensive conservative treatment and they presents for left total knee replacement. Risks, benefits, alternatives of the procedure were reviewed in detail and the patient desired to proceed. Risks including bleeding, infection, damage to surrounding structures, blood clots, pulmonary embolism, and death were discussed.  The patient understood understands the increased risk for perioperative medical complications due to their medical comorbidities. DESCRIPTION OF PROCEDURE:DESCRIPTION OF PROCEDURE: Epidural/spinal anesthesia was initiated. Two grams of cefazolin were administered within 30 minutes of incision. The left lower extremity was prepped and draped in the usual sterile fashion. The skin was covered with Ioban occlusive dressing. A tourniquet was not used. A midline skin incision was made with a 10 blade and small flaps were elevated. A SUBVASTUS arthrotomy was made to the knee. I released the ACL and menisci and performed a limited medial release. I then obtained all anatomic landmarks using the Noom system. The tibia was subluxed and I carried out a tibial resection with approximately 2-3 mm from the low side . The meniscal remnants were removed. I then placed the tensor  and took the knee through a range of motion. I utlized the balance curve to modify our femoral cuts. Anterior, posterior, and chamfer cuts were carried out using the [employer] robot. I then prepared the femur for the planned size and drilled lug holes. The tibial was then sized and correct rotation was identified using the medial 1/3 of the tibial tubercle as a landmark. The tibia was prepared using a drill followed by a keel punch. Trials were placed. The patella was sized using a caliper and an appropriate resection  was performed. Lug holes were drilled and a trial was fitted. The knee tracked well with all trial implants. The trials were then removed. Bony surfaces were drilled, lavaged, and dried. All components were cemented. Excess cement was removed. Polyethylene component was placed. After the cement had fully cured, the knee was ranged and  irrigated copiously with pulsatile lavage. All surrounding soft tissues were infiltrated with local anesthetic. The arthrotomy  was closed with running quill suture and 0 vicryl suture. Skin and subcutaneous tissues were irrigated and closed in standard fashion with 2-0 vicryl and 3-0 monocryl. An aquacel dressing was placed. The patient underwent straight catheterization at the end of the case. Mati Navarro was critical for banks portions of the case including retractor management, wound closure, and dressing application. There was no one else available to perform these specific duties. There were no complications. The procedure was a robotic assisted  LEFT TOTAL KNEE REPLACEMENT. No specimens were obtained/sent. The patient was transferred to the recovery room in stable condition.       August Calvillo MD

## 2022-09-07 NOTE — PROGRESS NOTES
Occupational Therapy   Orders received, chart review completed. Note patient POD #0 s/p L TKA. OT will follow up tomorrow for evaluation. Recommend OOB to chair three times a day for meals, self-completion of ADLs as able and medically stable. Thank you.

## 2022-09-08 ENCOUNTER — HOME CARE VISIT (OUTPATIENT)
Dept: SCHEDULING | Facility: HOME HEALTH | Age: 57
End: 2022-09-08
Payer: MEDICARE

## 2022-09-08 PROCEDURE — G0151 HHCP-SERV OF PT,EA 15 MIN: HCPCS

## 2022-09-08 PROCEDURE — 400013 HH SOC

## 2022-09-10 VITALS
OXYGEN SATURATION: 98 % | DIASTOLIC BLOOD PRESSURE: 75 MMHG | HEIGHT: 66 IN | TEMPERATURE: 98 F | WEIGHT: 160 LBS | SYSTOLIC BLOOD PRESSURE: 120 MMHG | RESPIRATION RATE: 16 BRPM | HEART RATE: 80 BPM | BODY MASS INDEX: 25.71 KG/M2

## 2022-09-19 DIAGNOSIS — Z96.652 STATUS POST LEFT KNEE REPLACEMENT: Primary | ICD-10-CM

## 2022-09-30 DIAGNOSIS — M17.11 ARTHRITIS OF KNEE, RIGHT: Primary | ICD-10-CM

## 2022-10-06 ENCOUNTER — OFFICE VISIT (OUTPATIENT)
Dept: ORTHOPEDIC SURGERY | Age: 57
End: 2022-10-06
Payer: MEDICARE

## 2022-10-06 VITALS — BODY MASS INDEX: 25.71 KG/M2 | WEIGHT: 160 LBS | HEIGHT: 66 IN

## 2022-10-06 DIAGNOSIS — Z96.652 STATUS POST LEFT KNEE REPLACEMENT: Primary | ICD-10-CM

## 2022-10-06 PROCEDURE — G9899 SCRN MAM PERF RSLTS DOC: HCPCS | Performed by: ORTHOPAEDIC SURGERY

## 2022-10-06 PROCEDURE — 99024 POSTOP FOLLOW-UP VISIT: CPT | Performed by: ORTHOPAEDIC SURGERY

## 2022-10-06 PROCEDURE — G8427 DOCREV CUR MEDS BY ELIG CLIN: HCPCS | Performed by: ORTHOPAEDIC SURGERY

## 2022-10-06 PROCEDURE — G9717 DOC PT DX DEP/BP F/U NT REQ: HCPCS | Performed by: ORTHOPAEDIC SURGERY

## 2022-10-06 PROCEDURE — G8419 CALC BMI OUT NRM PARAM NOF/U: HCPCS | Performed by: ORTHOPAEDIC SURGERY

## 2022-10-06 PROCEDURE — 3017F COLORECTAL CA SCREEN DOC REV: CPT | Performed by: ORTHOPAEDIC SURGERY

## 2022-10-06 NOTE — PROGRESS NOTES
Shahida Ricardo (: 1965) is a 59 y.o. female patient, here for evaluation of the following chief complaint(s):  Surgical Follow-up (Left knee follow up/)       ASSESSMENT/PLAN:  Below is the assessment and plan developed based on review of pertinent history, physical exam, labs, studies, and medications. Radiographs reviewed including 3 views of the left knee. Status post left knee arthroplasty. No evidence of aseptic loosening. Overall alignment is appropriate. Patella tracking centrally. Assessment and plan: Status post left knee arthroplasty on 2022. The patient is very happy with  progress and is doing well. I would like to see them back in 6 weeks. Main complaint today is ongoing pain in her right knee. She has severe tricompartmental knee osteoarthritic changes noted. We will plan to move forward with a right total knee replacement. Risks and benefits of joint arthroplasty discussed at length including but not limited to bleeding, need for blood transfusion, infection, damage to surrounding structures, intraoperative fracture, blood clots, pulmonary embolism, death. The patient understands the risks of surgery. All questions answered. We elected to move forward. 1. Status post left knee replacement  -     XR KNEE LT 3 V; Future      Encounter Diagnosis   Name Primary? Status post left knee replacement Yes        Return in about 6 weeks (around 2022), or if symptoms worsen or fail to improve. SUBJECTIVE/OBJECTIVE:  Shahida Ricardo (: 1965) is a 59 y.o. female who presents today for the following:  Chief Complaint   Patient presents with    Surgical Follow-up     Left knee follow up         64-year-old female comes in today for follow-up. She is status post left total knee replacement on 2022. Postoperatively she is doing very well. She only has mild stiffness in the morning. Mild pain with straightening knee fully and standing upright.   She has no pain rising from sitting to standing or bending to floor to  an object. She does have moderate pain still with twisting pivoting her knee and going up and down stairs. She stopped using her walker 1 week after surgery. She also continues to have severe pain in her right knee. Affects her daily. IMAGING:  XR Results (most recent):  Results from Appointment encounter on 10/06/22    XR KNEE LT 3 V    Narrative  Radiographs reviewed including 3 views of the left knee. Status post left knee arthroplasty. No evidence of aseptic loosening. Overall alignment is appropriate. Patella tracking centrally. Allergies   Allergen Reactions    Pcn [Penicillins] Unknown (comments)     NO REPORTED SEVERE NON-IGE-MEDIATED REACTIONS    Penicillin G Rash     NO REPORTED SEVERE NON-IGE-MEDIATED REACTON       Current Outpatient Medications   Medication Sig    acetaminophen (TYLENOL) 650 mg TbER Take 650 mg by mouth every eight (8) hours. pain    acetaminophen (TYLENOL) 325 mg tablet Take 2 Tablets by mouth every six (6) hours. aspirin delayed-release 81 mg tablet Take 1 Tablet by mouth two (2) times a day for 30 days. senna-docusate (PERICOLACE) 8.6-50 mg per tablet Take 1 Tablet by mouth two (2) times a day for 30 days. meloxicam (MOBIC) 7.5 mg tablet Take 1 Tablet by mouth daily for 30 days. quetiapine fumarate (QUETIAPINE PO) Take 1 Tablet by mouth nightly. multivit-min/iron/folic/lutein (MULTIVITAMIN WOMEN 50 PLUS PO) Take 1 Tablet by mouth every morning. Lacto no.76/Bifido/FOS/larch (WOMEN'S PROBIOTIC PO) Take 10 mg by mouth daily. magnesium citrate 100 mg cap Take 100 mg by mouth every morning. cholecalciferol, vitamin D3, (VITAMIN D3 PO) Take 1 Tablet by mouth daily. ascorbic acid, vitamin C, (VITAMIN C) 500 mg tablet Take 1,000 mg by mouth daily. LORazepam (ATIVAN) 1 mg tablet Take 1 mg by mouth as needed. No current facility-administered medications for this visit. Past Medical History:   Diagnosis Date    Aggressive outburst     Anxiety disorder     Arthritis 2008    Knees and hands    Artificial menopause     Bipolar affective disorder ([facility] 01/01/2007    Endometriosis     Leukopenia     Psychiatric disorder [id number]    Bipolar episodes    Psychotic disorder ([facility]     Substance abuse ([facility]         Past Surgical History:   Procedure Laterality Date    HX BREAST BIOPSY Right     Benign    HX GI      HX ORTHOPAEDIC  03/2018    REPAIR BROKEN FEMUR/HIP    HX SALPINGO-OOPHORECTOMY Bilateral 2010       Family History   Problem Relation Age of Onset    Other Mother         clotting disorder    OSTEOARTHRITIS Mother         Had both knees replaced    Heart Attack Father         X 3    Hypertension Father     Kidney Disease Father     Hypertension Sister     Hypertension Brother     High Cholesterol Brother     Hypertension Brother     Heart Attack Brother     OSTEOARTHRITIS Maternal Aunt         Both knees replaced    Cancer Maternal Grandmother         lung    OSTEOARTHRITIS Maternal Grandmother         Both knees replaced    Alzheimer's Disease Paternal Grandmother     Cancer Paternal Grandfather         Lung cancer        Social History     Tobacco Use    Smoking status: Never    Smokeless tobacco: Never   Substance Use Topics    Alcohol use: Yes     Alcohol/week: 4.0 standard drinks     Types: 4 Shots of liquor per week        All systems reviewed x 12 and were negative with the exception of None      No flowsheet data found. Vitals:  Ht 5' 6\" (1.676 m)   Wt 160 lb (72.6 kg)   BMI 25.82 kg/m²    Body mass index is 25.82 kg/m². Physical Exam    Gen: NAD    Resp: Non-labored    LLE: Midline incision is healing well with no evidence of infection. No erythema. Range of motion 0-120°. No extensor lag. Grossly stable in the coronal and sagittal planes. No calf tenderness. No evidence of a DVT. Motor grossly intact. Sensation intact to light touch throughout. Palpable pedal pulses. Koko Kang M.D. was available for immediate consultation as the supervising physician. An electronic signature was used to authenticate this note.   -- Analia Steward PA-C

## 2022-10-25 ENCOUNTER — HOSPITAL ENCOUNTER (OUTPATIENT)
Dept: PREADMISSION TESTING | Age: 57
Discharge: HOME | End: 2022-10-25
Payer: MEDICARE

## 2022-10-25 VITALS
BODY MASS INDEX: 26.37 KG/M2 | DIASTOLIC BLOOD PRESSURE: 90 MMHG | TEMPERATURE: 98.4 F | SYSTOLIC BLOOD PRESSURE: 149 MMHG | HEART RATE: 71 BPM | OXYGEN SATURATION: 96 % | WEIGHT: 158.29 LBS | HEIGHT: 65 IN

## 2022-10-25 LAB
ABO + RH BLD: NORMAL
ANION GAP SERPL CALC-SCNC: 4 MMOL/L (ref 5–15)
APPEARANCE UR: CLEAR
BACTERIA URNS QL MICRO: NEGATIVE /HPF
BILIRUB UR QL: NEGATIVE
BLOOD GROUP ANTIBODIES SERPL: NORMAL
BUN SERPL-MCNC: 12 MG/DL (ref 6–20)
BUN/CREAT SERPL: 19 (ref 12–20)
CALCIUM SERPL-MCNC: 9.7 MG/DL (ref 8.5–10.1)
CHLORIDE SERPL-SCNC: 107 MMOL/L (ref 97–108)
CO2 SERPL-SCNC: 29 MMOL/L (ref 21–32)
COLOR UR: NORMAL
CREAT SERPL-MCNC: 0.63 MG/DL (ref 0.55–1.02)
EPITH CASTS URNS QL MICRO: NORMAL /LPF
ERYTHROCYTE [DISTWIDTH] IN BLOOD BY AUTOMATED COUNT: 12.6 % (ref 11.5–14.5)
GLUCOSE SERPL-MCNC: 90 MG/DL (ref 65–100)
GLUCOSE UR STRIP.AUTO-MCNC: NEGATIVE MG/DL
HCT VFR BLD AUTO: 36.9 % (ref 35–47)
HGB BLD-MCNC: 11.9 G/DL (ref 11.5–16)
HGB UR QL STRIP: NEGATIVE
INR PPP: 1 (ref 0.9–1.1)
KETONES UR QL STRIP.AUTO: NEGATIVE MG/DL
LEUKOCYTE ESTERASE UR QL STRIP.AUTO: NEGATIVE
MCH RBC QN AUTO: 28.6 PG (ref 26–34)
MCHC RBC AUTO-ENTMCNC: 32.2 G/DL (ref 30–36.5)
MCV RBC AUTO: 88.7 FL (ref 80–99)
NITRITE UR QL STRIP.AUTO: NEGATIVE
NRBC # BLD: 0 K/UL (ref 0–0.01)
NRBC BLD-RTO: 0 PER 100 WBC
PH UR STRIP: 6.5 [PH] (ref 5–8)
PLATELET # BLD AUTO: 420 K/UL (ref 150–400)
PMV BLD AUTO: 8.5 FL (ref 8.9–12.9)
POTASSIUM SERPL-SCNC: 4.2 MMOL/L (ref 3.5–5.1)
PROT UR STRIP-MCNC: NEGATIVE MG/DL
PROTHROMBIN TIME: 10.3 SEC (ref 9–11.1)
RBC # BLD AUTO: 4.16 M/UL (ref 3.8–5.2)
RBC #/AREA URNS HPF: NORMAL /HPF (ref 0–5)
SODIUM SERPL-SCNC: 140 MMOL/L (ref 136–145)
SP GR UR REFRACTOMETRY: <1.005 (ref 1–1.03)
SPECIMEN EXP DATE BLD: NORMAL
UA: UC IF INDICATED,UAUC: NORMAL
UROBILINOGEN UR QL STRIP.AUTO: 0.2 EU/DL (ref 0.2–1)
WBC # BLD AUTO: 5 K/UL (ref 3.6–11)
WBC URNS QL MICRO: NORMAL /HPF (ref 0–4)

## 2022-10-25 PROCEDURE — 36415 COLL VENOUS BLD VENIPUNCTURE: CPT

## 2022-10-25 PROCEDURE — 80048 BASIC METABOLIC PNL TOTAL CA: CPT

## 2022-10-25 PROCEDURE — 81001 URINALYSIS AUTO W/SCOPE: CPT

## 2022-10-25 PROCEDURE — 85610 PROTHROMBIN TIME: CPT

## 2022-10-25 PROCEDURE — 86900 BLOOD TYPING SEROLOGIC ABO: CPT

## 2022-10-25 PROCEDURE — 85027 COMPLETE CBC AUTOMATED: CPT

## 2022-10-25 RX ORDER — SENNOSIDES 8.6 MG/1
1 TABLET ORAL DAILY
COMMUNITY

## 2022-10-25 NOTE — PERIOP NOTES
[address] [facility] INSTRUCTIONS  ORTHOPAEDIC    Surgery Date:   11/2/22    Your surgeon's office or [facility] staff will call you between 4 PM- 8 PM the day before surgery with your arrival time. If your surgery is on a Monday, you will receive a call the preceding Friday. Please report to [facility] Patient [facility]/Admitting on the 1st floor. Bring your insurance card, photo identification, and any copayment (if applicable). If you are going home the same day of your surgery, you must have a responsible adult to drive you home. You need to have a responsible adult to stay with you the first 24 hours after surgery and you should not drive a car for 24 hours following your surgery. Do NOT eat any solid foods after midnight the night before surgery including candy, mints or gum. You may drink clear liquids from midnight until 1 hour prior to arrival time. You may drink up to 12 ounces at one time every 4 hours. Do NOT drink alcohol or smoke 24 hours before surgery. STOP smoking for 14 days prior as it helps with breathing and healing after surgery. If your arrival time is 3pm or later, you may eat a light breakfast before 8am (toast, bagel-no butter, black coffee, plain tea, fruit juice-no pulp) Please note special instructions, if applicable, below for medications. If you are being admitted to the hospital,please leave personal belongings/luggage in your car until you have an assigned hospital room number. Please wear comfortable clothes. Wear your glasses instead of contacts. We ask that all money, jewelry and valuables be left at home. Wear no make up, particularly mascara, the day of surgery. All body piercings, rings, and jewelry need to be removed and left at home. Please remove any nail polish or artificial nails from your fingernails. Please wear your hair loose or down. Please no pony-tails, buns, or any metal hair accessories.  If you shower the morning of surgery, please do not apply any lotions or powders afterwards. You may wear deodorant. Do not shave any body area within 24 hours of your surgery. Please follow all instructions to avoid any potential surgical cancellation. Should your physical condition change, (i.e. fever, cold, flu, etc.) please notify your surgeon as soon as possible. It is important to be on time. If a situation occurs where you may be delayed, please call:  [phone] / [phone] on the day of surgery. The Preadmission Testing staff can be reached at [phone]. Special instructions: NONE    Current Outpatient Medications   Medication Sig    Senna 8.6 mg tablet Take 1 Tablet by mouth daily. acetaminophen (TYLENOL) 650 mg TbER Take 650 mg by mouth every eight (8) hours. pain    quetiapine fumarate (QUETIAPINE PO) Take 1 Tablet by mouth nightly. multivit-min/iron/folic/lutein (MULTIVITAMIN WOMEN 50 PLUS PO) Take 1 Tablet by mouth every morning. Lacto no.76/Bifido/FOS/larch (WOMEN'S PROBIOTIC PO) Take 10 mg by mouth daily. magnesium citrate 100 mg cap Take 100 mg by mouth every morning. cholecalciferol, vitamin D3, (VITAMIN D3 PO) Take 1 Tablet by mouth daily. ascorbic acid, vitamin C, (VITAMIN C) 500 mg tablet Take 1,000 mg by mouth daily. LORazepam (ATIVAN) 1 mg tablet Take 1 mg by mouth as needed. acetaminophen (TYLENOL) 325 mg tablet Take 2 Tablets by mouth every six (6) hours. No current facility-administered medications for this encounter. YOU MUST ONLY TAKE THESE MEDICATIONS THE MORNING OF SURGERY WITH A SIP OF WATER: NONE  MEDICATIONS TO TAKE THE MORNING OF SURGERY ONLY IF NEEDED: TYLENOL, ATIVAN  HOLD these prescription medications BEFORE Surgery: NONE  Ask your surgeon/prescribing physician about when/if to STOP taking these medications: NONE  Stop any non-steroidal anti-inflammatory drugs (i.e. Ibuprofen, Naproxen, Advil, Aleve) 3 days before surgery. You may take Tylenol.   STOP all vitamins and herbal supplements 1 week prior to  surgery. If you are currently taking Plavix, Coumadin, or any other blood-thinning/anticoagulant medication contact your prescribing physician for instructions. Preventing Infections Before and After - Your Surgery    IMPORTANT INSTRUCTIONS    You play an important role in your health and preparation for surgery. To reduce the germs on your skin you will need to shower with CHG soap (Chorhexidine gluconate 4%) two times before surgery. CHG soap (Hibiclens, Hex-A-Clens or store brand)  CHG soap will be provided at your Preadmission Testing (PAT) appointment. If you do not have a PAT appointment before surgery, you may arrange to  CHG soap from our office or purchase CHG soap at a pharmacy, grocery or department store. You need to purchase TWO 4 ounce bottles to use for your 2 showers. Steps to follow:  Charan Moore your hair with your normal shampoo and your body with regular soap and rinse well to remove shampoo and soap from your skin. Wet a clean washcloth and turn off the shower. Put CHG soap on washcloth and apply to your entire body from the neck down. Do not use on your head, face or private parts(genitals). Do not use CHG soap on open sores, wounds or areas of skin irritation. Wash you body gently for 5 minutes. Do not wash your skin too hard. This soap does not create lather. Pay special attention to your underarms and from your belly button to your feet. Turn the shower back on and rinse well to get CHG soap off your body. Pat your skin dry with a clean, dry towel. Do not apply lotions or moisturizer. Put on clean clothes and sleep on fresh bed sheets and do not allow pets to sleep with you.     Shower with CHG soap 2 times before your surgery  The evening before your surgery  The morning of your surgery      Tips to help prevent infections after your surgery:  Protect your surgical wound from germs:  Hand washing is the most important thing you and your caregivers can do to prevent infections. Keep your bandage clean and dry! Do not touch your surgical wound. Use clean, freshly washed towels and washcloths every time you shower; do not share bath linens with others. Until your surgical wound is healed, wear clothing and sleep on bed linens each day that are clean and freshly washed. Do not allow pets to sleep in your bed with you or touch your surgical wound. Do not smoke - smoking delays wound healing. This may be a good time to stop smoking. If you have diabetes, it is important for you to manage your blood sugar levels properly before your surgery as well as after your surgery. Poorly managed blood sugar levels slow down wound healing and prevent you from healing completely. Prevention of Infection  Testing for Staphylococcus aureus on your skin before surgery    Staphylococcus aureus (staph) is a common bacteria that is found on the body. It normally does not cause infection on healthy skin. Before surgery, you will be tested to see if you have staph by swabbing the inside of your nose. When you have an incision with surgery, the goal is to protect that incision from infection. Removal of the staph bacteria before surgery can decrease the risk of a surgical site infection. If your nose swab is positive for staph you will be called. Your treatment will include 2 steps:  Prescription for Mupirocin ointment to be used in each nostril twice a day for 5 days. Showering with Chlorhexidine (CHG) liquid soap for 5 days prior to surgery. How to use Mupirocin ointment in your nose   the prescription from your pharmacy. You will receive a large tube of ointment which will be big enough for all of your treatments. You will apply this ointment to each nostril 2 times a day for 5 days. Wash your hands with  gel or soap and water for 20 seconds before using ointment. Place a pea-sized amount of ointment on a cotton Q-tip.   Apply ointment just inside of each nostril with the Q-tip. Do not push Q-tip or ointment deep inside you nose. Press your nostrils together and massage for a few seconds. Wash your hands with  gel or soap and water after you are finished. Do not get ointment near your eyes. If it gets into your eyes, rinse them with cool water. If you need to use nasal spray, clean the tip of the bottle with alcohol before use and do not use both at the same time. If you are scheduled for COVID testing during the 5 days, do NOT apply morning dose until after the COVID test has been performed. How to use Chlorhexidine (CHG) 4% liquid soap  Purchase an 8 ounce bottle of CHG liquid soap (Chlorhexidine 4%, Hibiclens, Hex-A-Clens or store brand) at a pharmacy or grocery store. Wash your hair with your normal shampoo and your body with regular soap and rinse well to remove shampoo and soap from your skin. Wet a clean washcloth and turn off the shower. Put CHG soap on washcloth and apply to your entire body from the neck down. Do not use on your head, face or private parts(genitals). Do not use CHG soap on open sores, wounds or areas of skin irritation. Wash your body gently for 5 minutes. Do not wash your skin too hard. This soap does not create lather. Pay special attention to your underarms and from your belly button to your feet. Turn the shower back on and rinse well to get CHG soap off your body. Pat your skin dry with a clean, dry towel. Do not apply lotions or moisturizer. Put on clean clothes and sleep on fresh bed sheets the night before surgery. Do not allow pets to sleep with you. Eating and Drinking Before Surgery    You may eat a regular dinner at the usual time on the day before your surgery. Do NOT eat any solid foods after midnight unless your arrival time at the hospital is 3pm or later.   You may drink clear liquids only from 12 midnight until 1 hours prior to your arrival time at the hospital on the day of your surgery. Do NOT drink alcohol. Clear liquids include:  Water  Fruit juices without pulp( i.e. apple juice)  Carbonated beverages  Black coffee (no cream/milk)  Tea (no cream/milk)  Gatorade  You may drink up to 12-16 ounces at one time every 4 hours between the hours of midnight and 1 hour before your arrival time at the hospital. Example- if your arrival time at the hospital is 6am, you may drink 12-16 ounces of clear liquids no later than 5am.  If your arrival time at the hospital is 3pm or later, you may eat a light breakfast before 8am.  A light breakfast includes: Toast or bagel (no butter)  Black coffee (no cream/milk)  Tea (no cream/milk)  Fruit juices without pulp ( i.e. apple juice)  Do NOT eat meat, eggs, vegetables or fruit  If you have any questions, please contact your surgeon's office. Patient Information Regarding COVID Restrictions    Day of Procedure    Please park in the parking deck or any designated visitor parking lot. Enter the facility through the Main Entrance of the hospital.  On the day of surgery, please provide the cell phone number of the person who will be waiting for you to the Patient Access representative at the time of registration. Masks are highly recommended in the hospital, but not required. Once your procedure and the immediate recovery period is completed, a nurse in the recovery area will contact your designated visitor to inform them of your room number or to otherwise review other pertinent information regarding your care. Social distancing practices are strongly encouraged in waiting areas and the cafeteria. The patient was contacted in person. She verbalized understanding of all instructions and does not  need reinforcement.

## 2022-10-26 LAB
BACTERIA SPEC CULT: NORMAL
BACTERIA SPEC CULT: NORMAL
SERVICE CMNT-IMP: NORMAL

## 2022-10-26 NOTE — PERIOP NOTES
PAT Nurse Practitioner   Pre-Operative Chart Review/Assessment:-ORTHOPEDIC                Patient Name:  El Ansari                                                           Age:   59 y.o.    :  1965     Today's Date:  10/27/2022     Date of PAT:   10/25/22      Date of Surgery:    22      Procedure(s):  Right Total Knee Arthroplasty FAST TRACK     Surgeon:   Dr. Cristofer Wu                       PLAN:      1)  Medical Clearance/PCP:  Srikanth Rowe MD      2)  Cardiac Clearance:  EKG and METs reviewed. No further cardiac evaluation requested. EKG from 22 showed NSR. 3)  Diabetic Treatment Consult:  Not indicated. A1c-5.0 on 22      4)  Sleep Apnea evaluation:   Not indicated.  PIPO Score 1.       5) Treatment for MRSA/Staph Aureus:  Neg      6) Additional Concerns:  Bipolar, anxiety                Vital Signs:         Vitals:    10/25/22 0959   BP: (!) 149/90   Pulse: 71   Temp: 98.4 °F (36.9 °C)   SpO2: 96%   Weight: 71.8 kg (158 lb 4.6 oz)   Height: 5' 4.5\" (1.638 m)          Body mass index is 26.75 kg/m².         ____________________________________________  PAST MEDICAL HISTORY  Past Medical History:   Diagnosis Date    Aggressive outburst     Anxiety disorder     Arthritis 2008    Knees and hands    Artificial menopause     Bipolar affective disorder ([facility] [address] 2007    Endometriosis     Ill-defined condition 10/2022    COVID    Leukopenia     Psychiatric disorder [id number]    Bipolar episodes    Psychotic disorder ([facility] [address]     Substance abuse ([facility]       ____________________________________________  PAST SURGICAL HISTORY  Past Surgical History:   Procedure Laterality Date    HX BREAST BIOPSY Right     Benign    HX GI      HX KNEE REPLACEMENT Left 2022    HX ORTHOPAEDIC Right 2018    REPAIR BROKEN FEMUR/HIP    HX SALPINGO-OOPHORECTOMY Bilateral       ____________________________________________  HOME MEDICATIONS  Current Outpatient Medications   Medication Sig    Senna 8.6 mg tablet Take 1 Tablet by mouth daily. acetaminophen (TYLENOL) 650 mg TbER Take 650 mg by mouth every eight (8) hours. pain    quetiapine fumarate (QUETIAPINE PO) Take 1 Tablet by mouth nightly. multivit-min/iron/folic/lutein (MULTIVITAMIN WOMEN 50 PLUS PO) Take 1 Tablet by mouth every morning. Lacto no.76/Bifido/FOS/larch (WOMEN'S PROBIOTIC PO) Take 10 mg by mouth daily. magnesium citrate 100 mg cap Take 100 mg by mouth every morning. cholecalciferol, vitamin D3, (VITAMIN D3 PO) Take 1 Tablet by mouth daily. ascorbic acid, vitamin C, (VITAMIN C) 500 mg tablet Take 1,000 mg by mouth daily. LORazepam (ATIVAN) 1 mg tablet Take 1 mg by mouth as needed. acetaminophen (TYLENOL) 325 mg tablet Take 2 Tablets by mouth every six (6) hours. No current facility-administered medications for this encounter.      ____________________________________________  ALLERGIES  Allergies   Allergen Reactions    Pcn [Penicillins] Unknown (comments)     NO REPORTED SEVERE NON-IGE-MEDIATED REACTIONS    Penicillin G Rash     NO REPORTED SEVERE NON-IGE-MEDIATED REACTON      ____________________________________________  SOCIAL HISTORY  Social History     Tobacco Use    Smoking status: Never    Smokeless tobacco: Never   Substance Use Topics    Alcohol use:  Yes     Alcohol/week: 4.0 standard drinks     Types: 4 Shots of liquor per week      ____________________________________________   Internal Administration   First Dose COVID-19, PFIZER PURPLE top, DILUTE for use, (age 15 y+), IM, 30mcg/0.3mL  03/24/2021   Second Dose COVID-19, PFIZER PURPLE top, DILUTE for use, (age 15 y+), IM, 30mcg/0.3mL  04/14/2021      Last COVID Lab SARS-CoV-2, DOMINIK (no units)   Date Value   01/19/2021 Not Detected                    Labs:     Hospital Outpatient Visit on 10/25/2022   Component Date Value Ref Range Status    Sodium 10/25/2022 140  136 - 145 mmol/L Final    Potassium 10/25/2022 4.2  3.5 - 5.1 mmol/L Final    Chloride 10/25/2022 107  97 - 108 mmol/L Final    CO2 10/25/2022 29  21 - 32 mmol/L Final    Anion gap 10/25/2022 4 (A)  5 - 15 mmol/L Final    Glucose 10/25/2022 90  65 - 100 mg/dL Final    BUN 10/25/2022 12  6 - 20 MG/DL Final    Creatinine 10/25/2022 0.63  0.55 - 1.02 MG/DL Final    BUN/Creatinine ratio 10/25/2022 19  12 - 20   Final    eGFR 10/25/2022 >60  >60 ml/min/1.73m2 Final    Comment:      Pediatric calculator link: [email or web address] [email or web address]/kdoqi/[email or web address]       Effective Oct 3, 2022       These results are not intended for use in patients <25years of age. eGFR results are calculated without a race factor using  the 2021 CKD-EPI equation. Careful clinical correlation is recommended, particularly when comparing to results calculated using previous equations. The CKD-EPI equation is less accurate in patients with extremes of muscle mass, extra-renal metabolism of creatinine, excessive creatine ingestion, or following therapy that affects renal tubular secretion.       Calcium 10/25/2022 9.7  8.5 - 10.1 MG/DL Final    WBC 10/25/2022 5.0  3.6 - 11.0 K/uL Final    RBC 10/25/2022 4.16  3.80 - 5.20 M/uL Final    HGB 10/25/2022 11.9  11.5 - 16.0 g/dL Final    HCT 10/25/2022 36.9  35.0 - 47.0 % Final    MCV 10/25/2022 88.7  80.0 - 99.0 FL Final    MCH 10/25/2022 28.6  26.0 - 34.0 PG Final    MCHC 10/25/2022 32.2  30.0 - 36.5 g/dL Final    RDW 10/25/2022 12.6  11.5 - 14.5 % Final    PLATELET 58/85/3989 658 (A)  150 - 400 K/uL Final    MPV 10/25/2022 8.5 (A)  8.9 - 12.9 FL Final    NRBC 10/25/2022 0.0  0  WBC Final    ABSOLUTE NRBC 10/25/2022 0.00  0.00 - 0.01 K/uL Final    Crossmatch Expiration 10/25/2022 11/05/2022,2359   Final    ABO/Rh(D) 10/25/2022 A POSITIVE   Final    Antibody screen 10/25/2022 NEG   Final    INR 10/25/2022 1.0  0.9 - 1.1   Final    A single therapeutic range for Vit K antagonists may not be optimal for all indications - see June, 2008 issue of Chest, [employer] Evidence-Based Clinical Practice Guidelines, 8th Edition. Prothrombin time 10/25/2022 10.3  9.0 - 11.1 sec Final    Color 10/25/2022 YELLOW/STRAW    Final    Color Reference Range: Straw, Yellow or Dark Yellow    Appearance 10/25/2022 CLEAR  CLEAR   Final    Specific gravity 10/25/2022 <1.005  1.003 - 1.030 Final    pH (UA) 10/25/2022 6.5  5.0 - 8.0   Final    Protein 10/25/2022 Negative  NEG mg/dL Final    Glucose 10/25/2022 Negative  NEG mg/dL Final    Ketone 10/25/2022 Negative  NEG mg/dL Final    Bilirubin 10/25/2022 Negative  NEG   Final    Blood 10/25/2022 Negative  NEG   Final    Urobilinogen 10/25/2022 0.2  0.2 - 1.0 EU/dL Final    Nitrites 10/25/2022 Negative  NEG   Final    Leukocyte Esterase 10/25/2022 Negative  NEG   Final    WBC 10/25/2022 0-4  0 - 4 /hpf Final    RBC 10/25/2022 0-5  0 - 5 /hpf Final    Epithelial cells 10/25/2022 FEW  FEW /lpf Final    Epithelial cell category consists of squamous cells and /or transitional urothelial cells. Renal tubular cells, if present, are separately identified as such. Bacteria 10/25/2022 Negative  NEG /hpf Final    UA:UC IF INDICATED 10/25/2022 CULTURE NOT INDICATED BY UA RESULT  CNI   Final    Special Requests: 10/25/2022 NO SPECIAL REQUESTS    Final    Culture result: 10/25/2022 MRSA NOT PRESENT    Final    Culture result: 10/25/2022     Final                    Value:Screening of patient nares for MRSA is for surveillance purposes and, if positive, to facilitate isolation considerations in high risk settings. It is not intended for automatic decolonization interventions per se as regimens are not sufficiently effective to warrant routine use.      Admission on 09/07/2022, Discharged on 09/07/2022   Component Date Value Ref Range Status    Glucose (POC) 09/07/2022 94  65 - 117 mg/dL Final    Comment: (NOTE)  The FDA has indicated that no capillary point of care blood glucose  monitoring systems are approved for use in \"critically ill\" patients,  however they have not defined this population. The College of  American Pathologists has recommended that these devices should not  be used in cases such as severe hypotension, dehydration, shock, and  hyperglycemic-hyperosmolar state, amongst others. Venous or arterial  collection is the recommended specimen for testing these patients. Performed by 09/07/2022 1530 [facility] Outpatient Visit on 08/31/2022   Component Date Value Ref Range Status    Sodium 08/31/2022 139  136 - 145 mmol/L Final    Potassium 08/31/2022 3.8  3.5 - 5.1 mmol/L Final    Chloride 08/31/2022 107  97 - 108 mmol/L Final    CO2 08/31/2022 28  21 - 32 mmol/L Final    Anion gap 08/31/2022 4 (A)  5 - 15 mmol/L Final    Glucose 08/31/2022 95  65 - 100 mg/dL Final    BUN 08/31/2022 16  6 - 20 MG/DL Final    Creatinine 08/31/2022 0.71  0.55 - 1.02 MG/DL Final    BUN/Creatinine ratio 08/31/2022 23 (A)  12 - 20   Final    GFR est AA 08/31/2022 >60  >60 ml/min/1.73m2 Final    GFR est non-AA 08/31/2022 >60  >60 ml/min/1.73m2 Final    Estimated GFR is calculated using the IDMS-traceable Modification of Diet in Renal Disease (MDRD) Study equation, reported for both  Americans (GFRAA) and non- Americans (GFRNA), and normalized to 1.73m2 body surface area. The physician must decide which value applies to the patient.     Calcium 08/31/2022 9.1  8.5 - 10.1 MG/DL Final    WBC 08/31/2022 4.2  3.6 - 11.0 K/uL Final    RBC 08/31/2022 4.10  3.80 - 5.20 M/uL Final    HGB 08/31/2022 12.9  11.5 - 16.0 g/dL Final    HCT 08/31/2022 37.3  35.0 - 47.0 % Final    MCV 08/31/2022 91.0  80.0 - 99.0 FL Final    MCH 08/31/2022 31.5  26.0 - 34.0 PG Final    MCHC 08/31/2022 34.6  30.0 - 36.5 g/dL Final    RDW 08/31/2022 12.6  11.5 - 14.5 % Final    PLATELET 63/76/9081 804  150 - 400 K/uL Final    MPV 08/31/2022 8.9  8.9 - 12.9 FL Final    NRBC 08/31/2022 0.0  0  WBC Final    ABSOLUTE NRBC 08/31/2022 0.00  0.00 - 0.01 K/uL Final    Crossmatch Expiration 08/31/2022 09/10/2022,2359   Final    ABO/Rh(D) 08/31/2022 A POSITIVE   Final    Antibody screen 08/31/2022 NEG   Final    INR 08/31/2022 1.0  0.9 - 1.1   Final    A single therapeutic range for Vit K antagonists may not be optimal for all indications - see June, 2008 issue of Chest, American College of Chest Physicians Evidence-Based Clinical Practice Guidelines, 8th Edition. Prothrombin time 08/31/2022 10.2  9.0 - 11.1 sec Final    Color 08/31/2022 YELLOW/STRAW    Final    Color Reference Range: Straw, Yellow or Dark Yellow    Appearance 08/31/2022 CLEAR  CLEAR   Final    Specific gravity 08/31/2022 1.009  1.003 - 1.030   Final    pH (UA) 08/31/2022 5.5  5.0 - 8.0   Final    Protein 08/31/2022 Negative  NEG mg/dL Final    Glucose 08/31/2022 Negative  NEG mg/dL Final    Ketone 08/31/2022 Negative  NEG mg/dL Final    Bilirubin 08/31/2022 Negative  NEG   Final    Blood 08/31/2022 Negative  NEG   Final    Urobilinogen 08/31/2022 0.2  0.2 - 1.0 EU/dL Final    Nitrites 08/31/2022 Negative  NEG   Final    Leukocyte Esterase 08/31/2022 Negative  NEG   Final    WBC 08/31/2022 0-4  0 - 4 /hpf Final    RBC 08/31/2022 0-5  0 - 5 /hpf Final    Epithelial cells 08/31/2022 FEW  FEW /lpf Final    Epithelial cell category consists of squamous cells and /or transitional urothelial cells. Renal tubular cells, if present, are separately identified as such.     Bacteria 08/31/2022 1+ (A)  NEG /hpf Final    UA:UC IF INDICATED 08/31/2022 CULTURE NOT INDICATED BY UA RESULT  CNI   Final    Budding yeast 08/31/2022 PRESENT (A)  NEG   Final    Ventricular Rate 08/31/2022 60  BPM Final    Atrial Rate 08/31/2022 60  BPM Final    P-R Interval 08/31/2022 180  ms Final    QRS Duration 08/31/2022 94  ms Final    Q-T Interval 08/31/2022 432  ms Final    QTC Calculation (Bezet) 08/31/2022 432  ms Final    Calculated P Axis 08/31/2022 60  degrees Final    Calculated R Axis 08/31/2022 -5  degrees Final    Calculated T [place] 08/31/2022 36  degrees Final    Diagnosis 08/31/2022    Final                    Value:Normal sinus rhythm  Normal ECG  When compared with ECG of 09-SEP-2009 15:38,  No significant change was found  Confirmed by Lianne Borrero M.D., Altagracia Panda ([id number]) on 9/1/2022 11:30:51 AM      Hemoglobin A1c 08/31/2022 5.0  4.0 - 5.6 % Final    Comment: NEW METHOD  PLEASE NOTE NEW REFERENCE RANGE  (NOTE)  HbA1C Interpretive Ranges  <5.7              Normal  5.7 - 6.4         Consider Prediabetes  >6.5              Consider Diabetes      Est. average glucose 08/31/2022 97  mg/dL Final    Special Requests: 08/31/2022 NO SPECIAL REQUESTS    Final    Culture result: 08/31/2022 MRSA NOT PRESENT    Final    Culture result: 08/31/2022     Final                    Value:Screening of patient nares for MRSA is for surveillance purposes and, if positive, to facilitate isolation considerations in high risk settings. It is not intended for automatic decolonization interventions per se as regimens are not sufficiently effective to warrant routine use. Skin:     Denies open wounds, cuts, sores, rashes or other areas of concern in PAT assessment.           Camryn Calderon NP  Available via [address] [place] [address]

## 2022-11-01 RX ORDER — TRAMADOL HYDROCHLORIDE 50 MG/1
50 TABLET ORAL
Status: CANCELLED | OUTPATIENT
Start: 2022-11-01

## 2022-11-01 RX ORDER — KETOROLAC TROMETHAMINE 30 MG/ML
30 INJECTION, SOLUTION INTRAMUSCULAR; INTRAVENOUS EVERY 6 HOURS
Status: CANCELLED | OUTPATIENT
Start: 2022-11-01 | End: 2022-11-02

## 2022-11-01 RX ORDER — FACIAL-BODY WIPES
10 EACH TOPICAL DAILY PRN
Status: CANCELLED | OUTPATIENT
Start: 2022-11-03

## 2022-11-01 RX ORDER — SODIUM CHLORIDE 0.9 % (FLUSH) 0.9 %
5-40 SYRINGE (ML) INJECTION AS NEEDED
Status: CANCELLED | OUTPATIENT
Start: 2022-11-01

## 2022-11-01 RX ORDER — ONDANSETRON 2 MG/ML
4 INJECTION INTRAMUSCULAR; INTRAVENOUS
Status: CANCELLED | OUTPATIENT
Start: 2022-11-01 | End: 2022-11-02

## 2022-11-01 RX ORDER — HYDROMORPHONE HYDROCHLORIDE 1 MG/ML
0.5 INJECTION, SOLUTION INTRAMUSCULAR; INTRAVENOUS; SUBCUTANEOUS
Status: CANCELLED | OUTPATIENT
Start: 2022-11-01 | End: 2022-11-02

## 2022-11-01 RX ORDER — AMOXICILLIN 250 MG
1 CAPSULE ORAL 2 TIMES DAILY
Status: CANCELLED | OUTPATIENT
Start: 2022-11-01

## 2022-11-01 RX ORDER — NALOXONE HYDROCHLORIDE 0.4 MG/ML
0.4 INJECTION, SOLUTION INTRAMUSCULAR; INTRAVENOUS; SUBCUTANEOUS AS NEEDED
Status: CANCELLED | OUTPATIENT
Start: 2022-11-01

## 2022-11-01 RX ORDER — ASPIRIN 81 MG/1
81 TABLET ORAL 2 TIMES DAILY
Status: CANCELLED | OUTPATIENT
Start: 2022-11-01

## 2022-11-01 RX ORDER — LORAZEPAM 1 MG/1
1 TABLET ORAL AS NEEDED
Status: CANCELLED | OUTPATIENT
Start: 2022-11-01

## 2022-11-01 RX ORDER — SODIUM CHLORIDE 0.9 % (FLUSH) 0.9 %
5-40 SYRINGE (ML) INJECTION EVERY 8 HOURS
Status: CANCELLED | OUTPATIENT
Start: 2022-11-01

## 2022-11-01 RX ORDER — POLYETHYLENE GLYCOL 3350 17 G/17G
17 POWDER, FOR SOLUTION ORAL DAILY
Status: CANCELLED | OUTPATIENT
Start: 2022-11-01

## 2022-11-01 RX ORDER — FAMOTIDINE 20 MG/1
20 TABLET, FILM COATED ORAL 2 TIMES DAILY
Status: CANCELLED | OUTPATIENT
Start: 2022-11-01

## 2022-11-01 RX ORDER — HYDROXYZINE HYDROCHLORIDE 10 MG/1
10 TABLET, FILM COATED ORAL
Status: CANCELLED | OUTPATIENT
Start: 2022-11-01

## 2022-11-02 ENCOUNTER — ANESTHESIA (OUTPATIENT)
Dept: SURGERY | Age: 57
End: 2022-11-02
Payer: MEDICARE

## 2022-11-02 ENCOUNTER — ANESTHESIA EVENT (OUTPATIENT)
Dept: SURGERY | Age: 57
End: 2022-11-02
Payer: MEDICARE

## 2022-11-02 ENCOUNTER — HOSPITAL ENCOUNTER (OUTPATIENT)
Age: 57
Discharge: HOME HEALTH | End: 2022-11-02
Attending: ORTHOPAEDIC SURGERY | Admitting: ORTHOPAEDIC SURGERY
Payer: MEDICARE

## 2022-11-02 VITALS
DIASTOLIC BLOOD PRESSURE: 85 MMHG | BODY MASS INDEX: 26.7 KG/M2 | WEIGHT: 158 LBS | OXYGEN SATURATION: 97 % | RESPIRATION RATE: 17 BRPM | SYSTOLIC BLOOD PRESSURE: 120 MMHG | HEART RATE: 83 BPM | TEMPERATURE: 98 F

## 2022-11-02 DIAGNOSIS — M17.11 PRIMARY OSTEOARTHRITIS OF RIGHT KNEE: Primary | ICD-10-CM

## 2022-11-02 LAB
GLUCOSE BLD STRIP.AUTO-MCNC: 91 MG/DL (ref 65–117)
SERVICE CMNT-IMP: NORMAL

## 2022-11-02 PROCEDURE — 76210000023 HC REC RM PH II 2 TO 2.5 HR: Performed by: ORTHOPAEDIC SURGERY

## 2022-11-02 PROCEDURE — 74011000250 HC RX REV CODE- 250: Performed by: PHYSICIAN ASSISTANT

## 2022-11-02 PROCEDURE — 97161 PT EVAL LOW COMPLEX 20 MIN: CPT

## 2022-11-02 PROCEDURE — 74011250636 HC RX REV CODE- 250/636: Performed by: PHYSICIAN ASSISTANT

## 2022-11-02 PROCEDURE — 74011000250 HC RX REV CODE- 250: Performed by: NURSE ANESTHETIST, CERTIFIED REGISTERED

## 2022-11-02 PROCEDURE — 74011250636 HC RX REV CODE- 250/636: Performed by: ORTHOPAEDIC SURGERY

## 2022-11-02 PROCEDURE — 82962 GLUCOSE BLOOD TEST: CPT

## 2022-11-02 PROCEDURE — 77030031139 HC SUT VCRL2 J&J -A: Performed by: ORTHOPAEDIC SURGERY

## 2022-11-02 PROCEDURE — 77030042556 HC PNCL CAUT -B: Performed by: ORTHOPAEDIC SURGERY

## 2022-11-02 PROCEDURE — 74011250636 HC RX REV CODE- 250/636

## 2022-11-02 PROCEDURE — 27447 TOTAL KNEE ARTHROPLASTY: CPT | Performed by: ORTHOPAEDIC SURGERY

## 2022-11-02 PROCEDURE — 97530 THERAPEUTIC ACTIVITIES: CPT

## 2022-11-02 PROCEDURE — 97116 GAIT TRAINING THERAPY: CPT

## 2022-11-02 PROCEDURE — 74011000258 HC RX REV CODE- 258: Performed by: NURSE ANESTHETIST, CERTIFIED REGISTERED

## 2022-11-02 PROCEDURE — 77030010783 HC BOWL MX BN CEM J&J -B: Performed by: ORTHOPAEDIC SURGERY

## 2022-11-02 PROCEDURE — 77030000032 HC CUF TRNQT ZIMM -B: Performed by: ORTHOPAEDIC SURGERY

## 2022-11-02 PROCEDURE — 74011250636 HC RX REV CODE- 250/636: Performed by: STUDENT IN AN ORGANIZED HEALTH CARE EDUCATION/TRAINING PROGRAM

## 2022-11-02 PROCEDURE — 77030006835 HC BLD SAW SAG STRY -B: Performed by: ORTHOPAEDIC SURGERY

## 2022-11-02 PROCEDURE — 20985 CPTR-ASST DIR MS PX: CPT | Performed by: ORTHOPAEDIC SURGERY

## 2022-11-02 PROCEDURE — 74011250636 HC RX REV CODE- 250/636: Performed by: NURSE ANESTHETIST, CERTIFIED REGISTERED

## 2022-11-02 PROCEDURE — C9290 INJ, BUPIVACAINE LIPOSOME: HCPCS | Performed by: ORTHOPAEDIC SURGERY

## 2022-11-02 PROCEDURE — 76060000035 HC ANESTHESIA 2 TO 2.5 HR: Performed by: ORTHOPAEDIC SURGERY

## 2022-11-02 PROCEDURE — 76210000016 HC OR PH I REC 1 TO 1.5 HR: Performed by: ORTHOPAEDIC SURGERY

## 2022-11-02 PROCEDURE — C1776 JOINT DEVICE (IMPLANTABLE): HCPCS | Performed by: ORTHOPAEDIC SURGERY

## 2022-11-02 PROCEDURE — 64447 NJX AA&/STRD FEMORAL NRV IMG: CPT

## 2022-11-02 PROCEDURE — 74011250637 HC RX REV CODE- 250/637: Performed by: PHYSICIAN ASSISTANT

## 2022-11-02 PROCEDURE — 27447 TOTAL KNEE ARTHROPLASTY: CPT | Performed by: PHYSICIAN ASSISTANT

## 2022-11-02 PROCEDURE — C1713 ANCHOR/SCREW BN/BN,TIS/BN: HCPCS | Performed by: ORTHOPAEDIC SURGERY

## 2022-11-02 PROCEDURE — 77030020268 HC MISC GENERAL SUPPLY: Performed by: ORTHOPAEDIC SURGERY

## 2022-11-02 PROCEDURE — 74011000250 HC RX REV CODE- 250: Performed by: ORTHOPAEDIC SURGERY

## 2022-11-02 PROCEDURE — 2709999900 HC NON-CHARGEABLE SUPPLY: Performed by: ORTHOPAEDIC SURGERY

## 2022-11-02 PROCEDURE — 77030002933 HC SUT MCRYL J&J -A: Performed by: ORTHOPAEDIC SURGERY

## 2022-11-02 PROCEDURE — 76010000171 HC OR TIME 2 TO 2.5 HR INTENSV-TIER 1: Performed by: ORTHOPAEDIC SURGERY

## 2022-11-02 PROCEDURE — 77030035236 HC SUT PDS STRATFX BARB J&J -B: Performed by: ORTHOPAEDIC SURGERY

## 2022-11-02 PROCEDURE — 77030007866 HC KT SPN ANES BBMI -B: Performed by: STUDENT IN AN ORGANIZED HEALTH CARE EDUCATION/TRAINING PROGRAM

## 2022-11-02 PROCEDURE — 74011000258 HC RX REV CODE- 258: Performed by: ORTHOPAEDIC SURGERY

## 2022-11-02 PROCEDURE — 77030006822 HC BLD SAW SAG BRSM -B: Performed by: ORTHOPAEDIC SURGERY

## 2022-11-02 PROCEDURE — 77030020274 HC MISC IMPL ORTHOPEDIC: Performed by: ORTHOPAEDIC SURGERY

## 2022-11-02 DEVICE — KNEE K2 TOT HEMI ADV CMTLS IMPL CAPPED SYNTHES: Type: IMPLANTABLE DEVICE | Status: FUNCTIONAL

## 2022-11-02 DEVICE — ATTUNE KNEE SYSTEM TIBIAL BASE AFFIXIUM FIXED BEARING SIZE 4
Type: IMPLANTABLE DEVICE | Site: KNEE | Status: FUNCTIONAL
Brand: ATTUNE AFFIXIUM

## 2022-11-02 DEVICE — ATTUNE KNEE SYSTEM TIBIAL INSERT FIXED BEARING MEDIAL STABILIZED RIGHT AOX 6, 6MM
Type: IMPLANTABLE DEVICE | Site: KNEE | Status: FUNCTIONAL
Brand: ATTUNE

## 2022-11-02 DEVICE — ATTUNE PATELLA MEDIALIZED DOME 35MM CEMENTED AOX
Type: IMPLANTABLE DEVICE | Site: KNEE | Status: FUNCTIONAL
Brand: ATTUNE

## 2022-11-02 DEVICE — ATTUNE KNEE SYSTEM FEMORAL POROCOAT CRUCIATE RETAINING NARROW SIZE 6N RIGHT CEMENTLESS
Type: IMPLANTABLE DEVICE | Site: KNEE | Status: FUNCTIONAL
Brand: ATTUNE

## 2022-11-02 DEVICE — SMARTSET GHV GENTAMICIN HIGH VISCOSITY BONE CEMENT 40G
Type: IMPLANTABLE DEVICE | Site: KNEE | Status: FUNCTIONAL
Brand: SMARTSET

## 2022-11-02 RX ORDER — FAMOTIDINE 20 MG/1
20 TABLET, FILM COATED ORAL 2 TIMES DAILY
Qty: 60 TABLET | Refills: 0 | Status: SHIPPED | OUTPATIENT
Start: 2022-11-02 | End: 2022-12-02

## 2022-11-02 RX ORDER — LIDOCAINE HYDROCHLORIDE 10 MG/ML
0.1 INJECTION, SOLUTION EPIDURAL; INFILTRATION; INTRACAUDAL; PERINEURAL AS NEEDED
Status: DISCONTINUED | OUTPATIENT
Start: 2022-11-02 | End: 2022-11-02 | Stop reason: HOSPADM

## 2022-11-02 RX ORDER — SODIUM CHLORIDE, SODIUM LACTATE, POTASSIUM CHLORIDE, CALCIUM CHLORIDE 600; 310; 30; 20 MG/100ML; MG/100ML; MG/100ML; MG/100ML
INJECTION, SOLUTION INTRAVENOUS
Status: DISCONTINUED | OUTPATIENT
Start: 2022-11-02 | End: 2022-11-02 | Stop reason: HOSPADM

## 2022-11-02 RX ORDER — DEXTROMETHORPHAN HYDROBROMIDE, GUAIFENESIN 5; 100 MG/5ML; MG/5ML
650 LIQUID ORAL EVERY 8 HOURS
COMMUNITY

## 2022-11-02 RX ORDER — AMOXICILLIN 250 MG
1 CAPSULE ORAL 2 TIMES DAILY
Qty: 60 TABLET | Refills: 0 | Status: SHIPPED | OUTPATIENT
Start: 2022-11-02 | End: 2022-12-02

## 2022-11-02 RX ORDER — SODIUM CHLORIDE 0.9 % (FLUSH) 0.9 %
5-40 SYRINGE (ML) INJECTION EVERY 8 HOURS
Status: DISCONTINUED | OUTPATIENT
Start: 2022-11-02 | End: 2022-11-02 | Stop reason: HOSPADM

## 2022-11-02 RX ORDER — ACETAMINOPHEN 325 MG/1
650 TABLET ORAL ONCE
Status: DISCONTINUED | OUTPATIENT
Start: 2022-11-02 | End: 2022-11-02 | Stop reason: HOSPADM

## 2022-11-02 RX ORDER — ROPIVACAINE HYDROCHLORIDE 5 MG/ML
INJECTION, SOLUTION EPIDURAL; INFILTRATION; PERINEURAL
Status: DISCONTINUED | OUTPATIENT
Start: 2022-11-02 | End: 2022-11-02 | Stop reason: HOSPADM

## 2022-11-02 RX ORDER — HYDROMORPHONE HYDROCHLORIDE 1 MG/ML
0.2 INJECTION, SOLUTION INTRAMUSCULAR; INTRAVENOUS; SUBCUTANEOUS
Status: DISCONTINUED | OUTPATIENT
Start: 2022-11-02 | End: 2022-11-02 | Stop reason: HOSPADM

## 2022-11-02 RX ORDER — CELECOXIB 200 MG/1
200 CAPSULE ORAL ONCE
Status: COMPLETED | OUTPATIENT
Start: 2022-11-02 | End: 2022-11-02

## 2022-11-02 RX ORDER — FENTANYL CITRATE 50 UG/ML
25 INJECTION, SOLUTION INTRAMUSCULAR; INTRAVENOUS
Status: DISCONTINUED | OUTPATIENT
Start: 2022-11-02 | End: 2022-11-02 | Stop reason: HOSPADM

## 2022-11-02 RX ORDER — ASPIRIN 81 MG/1
81 TABLET ORAL 2 TIMES DAILY
Qty: 60 TABLET | Refills: 0 | Status: SHIPPED | OUTPATIENT
Start: 2022-11-02 | End: 2022-12-02

## 2022-11-02 RX ORDER — MIDAZOLAM HYDROCHLORIDE 1 MG/ML
1 INJECTION, SOLUTION INTRAMUSCULAR; INTRAVENOUS AS NEEDED
Status: DISCONTINUED | OUTPATIENT
Start: 2022-11-02 | End: 2022-11-02 | Stop reason: HOSPADM

## 2022-11-02 RX ORDER — ONDANSETRON 2 MG/ML
INJECTION INTRAMUSCULAR; INTRAVENOUS AS NEEDED
Status: DISCONTINUED | OUTPATIENT
Start: 2022-11-02 | End: 2022-11-02 | Stop reason: HOSPADM

## 2022-11-02 RX ORDER — PREGABALIN 75 MG/1
75 CAPSULE ORAL ONCE
Status: COMPLETED | OUTPATIENT
Start: 2022-11-02 | End: 2022-11-02

## 2022-11-02 RX ORDER — SODIUM CHLORIDE 9 MG/ML
125 INJECTION, SOLUTION INTRAVENOUS CONTINUOUS
Status: DISCONTINUED | OUTPATIENT
Start: 2022-11-02 | End: 2022-11-02 | Stop reason: HOSPADM

## 2022-11-02 RX ORDER — OXYCODONE HYDROCHLORIDE 5 MG/1
2.5-5 TABLET ORAL
Qty: 40 TABLET | Refills: 0 | Status: SHIPPED | OUTPATIENT
Start: 2022-11-02 | End: 2022-11-09

## 2022-11-02 RX ORDER — ONDANSETRON 2 MG/ML
4 INJECTION INTRAMUSCULAR; INTRAVENOUS AS NEEDED
Status: DISCONTINUED | OUTPATIENT
Start: 2022-11-02 | End: 2022-11-02 | Stop reason: HOSPADM

## 2022-11-02 RX ORDER — SODIUM CHLORIDE 0.9 % (FLUSH) 0.9 %
5-40 SYRINGE (ML) INJECTION AS NEEDED
Status: DISCONTINUED | OUTPATIENT
Start: 2022-11-02 | End: 2022-11-02 | Stop reason: HOSPADM

## 2022-11-02 RX ORDER — EPHEDRINE SULFATE/0.9% NACL/PF 50 MG/5 ML
SYRINGE (ML) INTRAVENOUS AS NEEDED
Status: DISCONTINUED | OUTPATIENT
Start: 2022-11-02 | End: 2022-11-02 | Stop reason: HOSPADM

## 2022-11-02 RX ORDER — ACETAMINOPHEN 500 MG
1000 TABLET ORAL ONCE
Status: DISCONTINUED | OUTPATIENT
Start: 2022-11-02 | End: 2022-11-02 | Stop reason: HOSPADM

## 2022-11-02 RX ORDER — FENTANYL CITRATE 50 UG/ML
INJECTION, SOLUTION INTRAMUSCULAR; INTRAVENOUS
Status: COMPLETED
Start: 2022-11-02 | End: 2022-11-02

## 2022-11-02 RX ORDER — PHENYLEPHRINE HCL IN 0.9% NACL 0.4MG/10ML
SYRINGE (ML) INTRAVENOUS
Status: DISCONTINUED | OUTPATIENT
Start: 2022-11-02 | End: 2022-11-02 | Stop reason: HOSPADM

## 2022-11-02 RX ORDER — NALOXONE HYDROCHLORIDE 4 MG/.1ML
SPRAY NASAL
Qty: 1 EACH | Refills: 0 | Status: SHIPPED | OUTPATIENT
Start: 2022-11-02 | End: 2022-11-29

## 2022-11-02 RX ORDER — OXYCODONE HYDROCHLORIDE 5 MG/1
TABLET ORAL
Status: DISCONTINUED
Start: 2022-11-02 | End: 2022-11-02 | Stop reason: HOSPADM

## 2022-11-02 RX ORDER — ACETAMINOPHEN 325 MG/1
650 TABLET ORAL EVERY 6 HOURS
Status: DISCONTINUED | OUTPATIENT
Start: 2022-11-02 | End: 2022-11-02 | Stop reason: HOSPADM

## 2022-11-02 RX ORDER — SODIUM CHLORIDE, SODIUM LACTATE, POTASSIUM CHLORIDE, CALCIUM CHLORIDE 600; 310; 30; 20 MG/100ML; MG/100ML; MG/100ML; MG/100ML
50 INJECTION, SOLUTION INTRAVENOUS CONTINUOUS
Status: DISCONTINUED | OUTPATIENT
Start: 2022-11-02 | End: 2022-11-02 | Stop reason: HOSPADM

## 2022-11-02 RX ORDER — FENTANYL CITRATE 50 UG/ML
50 INJECTION, SOLUTION INTRAMUSCULAR; INTRAVENOUS AS NEEDED
Status: DISCONTINUED | OUTPATIENT
Start: 2022-11-02 | End: 2022-11-02 | Stop reason: HOSPADM

## 2022-11-02 RX ORDER — OXYCODONE HYDROCHLORIDE 5 MG/1
5 TABLET ORAL
Status: DISCONTINUED | OUTPATIENT
Start: 2022-11-02 | End: 2022-11-02 | Stop reason: HOSPADM

## 2022-11-02 RX ORDER — PROPOFOL 10 MG/ML
INJECTION, EMULSION INTRAVENOUS
Status: DISCONTINUED | OUTPATIENT
Start: 2022-11-02 | End: 2022-11-02 | Stop reason: HOSPADM

## 2022-11-02 RX ORDER — MELOXICAM 7.5 MG/1
15 TABLET ORAL DAILY
Qty: 60 TABLET | Refills: 0 | Status: SHIPPED | OUTPATIENT
Start: 2022-11-02 | End: 2022-12-02

## 2022-11-02 RX ADMIN — TRANEXAMIC ACID 1 G: 100 INJECTION, SOLUTION INTRAVENOUS at 09:21

## 2022-11-02 RX ADMIN — ONDANSETRON HYDROCHLORIDE 4 MG: 2 INJECTION, SOLUTION INTRAMUSCULAR; INTRAVENOUS at 10:30

## 2022-11-02 RX ADMIN — Medication 10 MG: at 10:50

## 2022-11-02 RX ADMIN — PREGABALIN 75 MG: 75 CAPSULE ORAL at 08:13

## 2022-11-02 RX ADMIN — FENTANYL CITRATE 100 MCG: 50 INJECTION, SOLUTION INTRAMUSCULAR; INTRAVENOUS at 08:36

## 2022-11-02 RX ADMIN — MEPIVACAINE HYDROCHLORIDE 50 MG: 20 INJECTION, SOLUTION EPIDURAL; INFILTRATION at 09:00

## 2022-11-02 RX ADMIN — MIDAZOLAM 2 MG: 1 INJECTION INTRAMUSCULAR; INTRAVENOUS at 08:36

## 2022-11-02 RX ADMIN — OXYCODONE 5 MG: 5 TABLET ORAL at 12:24

## 2022-11-02 RX ADMIN — WATER 2 G: 1 INJECTION INTRAMUSCULAR; INTRAVENOUS; SUBCUTANEOUS at 09:14

## 2022-11-02 RX ADMIN — ONDANSETRON 4 MG: 2 INJECTION INTRAMUSCULAR; INTRAVENOUS at 12:27

## 2022-11-02 RX ADMIN — SODIUM CHLORIDE, POTASSIUM CHLORIDE, SODIUM LACTATE AND CALCIUM CHLORIDE: 600; 310; 30; 20 INJECTION, SOLUTION INTRAVENOUS at 08:47

## 2022-11-02 RX ADMIN — Medication 40 MCG/MIN: at 09:08

## 2022-11-02 RX ADMIN — SODIUM CHLORIDE, POTASSIUM CHLORIDE, SODIUM LACTATE AND CALCIUM CHLORIDE 50 ML/HR: 600; 310; 30; 20 INJECTION, SOLUTION INTRAVENOUS at 08:25

## 2022-11-02 RX ADMIN — WATER 2 G: 1 INJECTION INTRAMUSCULAR; INTRAVENOUS; SUBCUTANEOUS at 15:15

## 2022-11-02 RX ADMIN — FENTANYL CITRATE 25 MCG: 50 INJECTION, SOLUTION INTRAMUSCULAR; INTRAVENOUS at 12:03

## 2022-11-02 RX ADMIN — ROPIVACAINE HYDROCHLORIDE 25 ML: 5 INJECTION, SOLUTION EPIDURAL; INFILTRATION; PERINEURAL at 08:34

## 2022-11-02 RX ADMIN — CELECOXIB 200 MG: 200 CAPSULE ORAL at 08:13

## 2022-11-02 RX ADMIN — FENTANYL CITRATE 25 MCG: 50 INJECTION, SOLUTION INTRAMUSCULAR; INTRAVENOUS at 11:38

## 2022-11-02 RX ADMIN — ACETAMINOPHEN 650 MG: 325 TABLET ORAL at 13:56

## 2022-11-02 RX ADMIN — PROPOFOL 75 MCG/KG/MIN: 10 INJECTION, EMULSION INTRAVENOUS at 08:48

## 2022-11-02 NOTE — PROGRESS NOTES
The patient is a orthopedic fast track and plans to discharge home post surgery with [facility] and family to transport. The patient has dme. Medicare Outpatient Observation Notice (MOON)/ [place] Outpatient Observation Notice ([place] Backers) provided to patient/representative with verbal explanation of the notice. Time allotted for questions regarding the notice. Patient /representative provided a completed copy of the MOON/VOON notice. Copy placed on bedside chart.       Morales Hernandez RN/CRM

## 2022-11-02 NOTE — PROGRESS NOTES
Occupational Therapy    Chart reviewed and orders acknowledged. Patient is s/p R TKA POD #0 and received in fast track suite. Reporting no current OT needs. Reports confidence in abilities to complete dressing, patient had recent L TKA recently and able to recall thi dressing techniques with accuracy. No further OT needs at this time or anticipated at [place]. Will sign off. Thank you.      Kira Roger, OTD, OTR/L

## 2022-11-02 NOTE — PROGRESS NOTES
Discharge instructions reviewed with patient and family using teachback . All questions have been answered. Prescriptions sent to [facility] pharmacy. Vital signs stable, pain appropriately managed. Patient wheeled off the unit with PACU staff.

## 2022-11-02 NOTE — ANESTHESIA PROCEDURE NOTES
Peripheral Block    Start time: 11/2/2022 8:32 AM  End time: 11/2/2022 8:34 AM  Performed by: Antonio Olvera DO  Authorized by: Antonio Olvera DO       Pre-procedure: Indications: at surgeon's request and post-op pain management    Preanesthetic Checklist: patient identified, risks and benefits discussed, site marked, timeout performed and patient being monitored      Block Type:   Block Type:   Adductor canal  Laterality:  Right  Monitoring:  Standard ASA monitoring, continuous pulse ox, frequent vital sign checks, heart rate, responsive to questions and oxygen  Injection Technique:  Single shot  Procedures: ultrasound guided    Patient Position: supine  Prep: chlorhexidine    Location:  Mid thigh  Needle Type:  Stimuplex  Needle Gauge:  21 G  Needle Localization:  Ultrasound guidance and anatomical landmarks  Medication Injected:  Ropivacaine (PF) (NAROPIN)(0.5%) 5 mg/mL injection - Peripheral Nerve Block   25 mL - 11/2/2022 8:34:00 AM  Med Admin Time: 11/2/2022 8:34 AM    Assessment:  Number of attempts:  1  Injection Assessment:  Incremental injection every 5 mL, local visualized surrounding nerve on ultrasound, negative aspiration for blood, no paresthesia, no intravascular symptoms and ultrasound image on chart  Patient tolerance:  Patient tolerated the procedure well with no immediate complications  Assisted SRNA Candi Walker in performing procedure

## 2022-11-02 NOTE — PROGRESS NOTES
PHYSICAL THERAPY EVALUATION/DISCHARGE  Patient: Sergio Eddy (89 y.o. female)  Date: 11/2/2022  Primary Diagnosis: Primary osteoarthritis of right knee [M17.11]  Procedure(s) (LRB):  RIGHT KNEE ARTHROPLASTY (VELYS) (FAST TRACK) (Right) Day of Surgery   Precautions:   WBAT      ASSESSMENT  Based on the objective data described below, the patient presents POD # 0 right TKR with decreased AROM/strength and function right leg, decreased activity tolerance, decline in functional mobility and impaired standing balance/gait with RW. Pt s/p left TKA 8 weeks ago. Pt able to perform transfers - supine to/from sit with standby and pt using gait belt to lift right leg in/out of bed. Pt required supervision for transfers/amb with RW and performed stairs with CGA. Reviewed supine exercise, use of ice and elevation and progression of activity. Pt will have assist of significant other at discharge. Pt cleared for discharge. Functional Outcome Measure: The patient scored 80/100 on the Barthel Index outcome measure. Other factors to consider for discharge: only 3 DIMAS, has required DME     Further skilled acute physical therapy is not indicated at this time. PLAN :  Recommendation for discharge: (in order for the patient to meet his/her long term goals)  Physical therapy at least 2 days/week in the home     This discharge recommendation:  Has been made in collaboration with the attending provider and/or case management    IF patient discharges home will need the following DME: patient owns DME required for discharge       SUBJECTIVE:   Patient stated I not feeling any pain right now.     OBJECTIVE DATA SUMMARY:   HISTORY:    Past Medical History:   Diagnosis Date    Aggressive outburst     Anxiety disorder     Arthritis 2008    Knees and hands    Artificial menopause     Bipolar [facility] 01/01/2007    Endometriosis     Ill-defined condition 10/2022    COVID    Leukopenia     Psychiatric disorder [id number]    Bipolar episodes    Psychotic disorder ([facility]     Substance abuse ([facility]      Past Surgical History:   Procedure Laterality Date    HX BREAST BIOPSY Right     Benign    HX GI      HX KNEE REPLACEMENT Left 09/2022    HX ORTHOPAEDIC Right 03/2018    REPAIR BROKEN FEMUR/HIP    HX SALPINGO-OOPHORECTOMY Bilateral 2010       Prior level of function: Independent  Personal factors and/or comorbidities impacting plan of care: as above    Home Situation  Home Environment: Private residence  # Steps to Enter: 3  Rails to Enter: Yes  Hand Rails : Bilateral  One/Two Story Residence: One story  Living Alone: Yes  Support Systems: Spouse/Significant Other, Shelter  Patient Expects to be Discharged to[de-identified] Home with home health  Current DME Used/Available at Home: Cane, straight, Walker, rolling  Tub or Shower Type: Tub/Shower combination    EXAMINATION/PRESENTATION/DECISION MAKING:   Critical Behavior:  Neurologic State: Alert  Orientation Level: Oriented X4  Cognition: Appropriate decision making, Appropriate safety awareness  Safety/Judgement: Awareness of environment, Good awareness of safety precautions  Hearing: Auditory  Auditory Impairment: None  Skin:  Right leg covered with ace wrap - no drainage noted    Range Of Motion:  AROM: Within functional limits (except right leg)                       Strength:    Strength: Within functional limits (except right leg)                    Tone & Sensation:   Tone: Normal              Sensation: Intact               Coordination:  Coordination: Within functional limits  Functional Mobility:  Bed Mobility:     Supine to Sit: Stand-by assistance (cues given to use gait belt to assist right leg in/out of bed)  Sit to Supine: Stand-by assistance  Scooting: Stand-by assistance  Transfers:  Sit to Stand: Supervision  Stand to Sit: Supervision  Stand Pivot Transfers: Supervision                    Balance:   Sitting: Intact; Without support  Standing: Impaired; Without support  Standing - Static: Good;Constant support  Standing - Dynamic : Fair;Constant support (limited reaching - requires bilateral support of RW)  Ambulation/Gait Training:  Distance (ft): 100 Feet (ft)  Assistive Device: Walker, rolling;Gait belt  Ambulation - Level of Assistance: Stand-by assistance        Gait Abnormalities: Decreased step clearance  Right Side Weight Bearing: As tolerated  Left Side Weight Bearing: Full  Base of Support: Center of gravity altered;Shift to left  Stance: Right decreased  Speed/Mirta: Slow  Step Length: Right shortened;Left shortened  Swing Pattern: Right asymmetrical         Stairs:  Number of Stairs Trained: 4  Stairs - Level of Assistance: Contact guard assistance   Rail Use: Left  (cane right hand)    Therapeutic Exercises:   Pt instructed and performed ankle pumps and demonstrated proper use of incentive spirometer - to be performed x 10 reps once hr when awake. Pt instructed and performed quad sets, hamstring sets and heel slides - to be performed 3 - 5 reps once hr when awake as tolerated. Written instructions provided on pt's communication board. Functional Measure:  Barthel Index:    Bathin  Bladder: 10  Bowels: 10  Groomin  Dressin  Feeding: 10  Mobility: 10  Stairs: 5  Toilet Use: 10  Transfer (Bed to Chair and Back): 15  Total: 80/100       The Barthel ADL Index: Guidelines  1. The index should be used as a record of what a patient does, not as a record of what a patient could do. 2. The main aim is to establish degree of independence from any help, physical or verbal, however minor and for whatever reason. 3. The need for supervision renders the patient not independent. 4. A patient's performance should be established using the best available evidence. Asking the patient, friends/relatives and nurses are the usual sources, but direct observation and common sense are also important. However direct testing is not needed.   5. Usually the patient's performance over the preceding 24-48 hours is important, but occasionally longer periods will be relevant. 6. Middle categories imply that the patient supplies over 50 per cent of the effort. 7. Use of aids to be independent is allowed. Score Interpretation (from [facility])    Independent   60-79 Minimally independent   40-59 Partially dependent   20-39 Very dependent   <20 Totally dependent     -Corwin Kasper., BarthelSUSAN. (1965). Functional evaluation: the Barthel Index. [address] ([address] (1997). The Barthel activities of daily living index: self-reporting versus actual performance in the old (> or = 75 years). Journal of [address] [place] [address] 45(7), 14 [facility], JESA, Natlaia Najera., Amey Lesch. (1999). Measuring the change in disability after inpatient rehabilitation; comparison of the responsiveness of the Barthel Index and Functional [place] Measure. Journal of Neurology, Neurosurgery, and Psychiatry, 66(4), 818-427. Adán Burns, N.SURYA.JANIYA, FELISHA Kellogg, & Hi Cerda, MGiniA. (2004) Assessment of post-stroke quality of life in cost-effectiveness studies: The usefulness of the Barthel Index and the EuroQoL-5D. Quality of Life Research, 15, 417-56           Physical Therapy Evaluation Charge Determination   History Examination Presentation Decision-Making   LOW Complexity : Zero comorbidities / personal factors that will impact the outcome / POC LOW Complexity : 1-2 Standardized tests and measures addressing body structure, function, activity limitation and / or participation in recreation  LOW Complexity : Stable, uncomplicated  LOW Complexity : FOTO score of       Based on the above components, the patient evaluation is determined to be of the following complexity level: LOW     Pain Rating:  Pt denied pain.     Activity Tolerance:   Good - POD# 0 - mobilized without dizziness or nausea       After treatment patient left in no apparent distress:   Supine on stretcher in [employer], NP - Nasim Daniel with patient    COMMUNICATION/EDUCATION:   The patients plan of care was discussed with: Registered nurse. Fall prevention education was provided and the patient/caregiver indicated understanding. Pt instructed to have significant other provide standby assist/supervision for first 24 hours. Pt instructed to change positions slowly - sit edge of bed 3 - 4 mins prior to standing and to safely sit in chair if dizziness occurs.      Thank you for this referral.  Melodie Chavarria, PT   Time Calculation: 40 mins

## 2022-11-02 NOTE — ANESTHESIA PREPROCEDURE EVALUATION
Relevant Problems   NEUROLOGY   (+) Bipolar affective disorder, manic, severe, with psychotic behavior ([facility]       Anesthetic History   No history of anesthetic complications            Review of Systems / Medical History  Patient summary reviewed, nursing notes reviewed and pertinent labs reviewed    Pulmonary  Within defined limits                 Neuro/Psych   Within defined limits      Psychiatric history     Cardiovascular  Within defined limits                Exercise tolerance: >4 METS     GI/Hepatic/Renal  Within defined limits              Endo/Other  Within defined limits      Arthritis     Other Findings   Comments: Bipolar affective disorder, manic, severe, with psychotic behavior            Physical Exam    Airway  Mallampati: II  TM Distance: > 6 cm  Neck ROM: normal range of motion   Mouth opening: Normal     Cardiovascular  Regular rate and rhythm,  S1 and S2 normal,  no murmur, click, rub, or gallop             Dental  No notable dental hx       Pulmonary  Breath sounds clear to auscultation               Abdominal  GI exam deferred       Other Findings            Anesthetic Plan    ASA: 2  Anesthesia type: spinal, regional and MAC      Post-op pain plan if not by surgeon: peripheral nerve block single    Induction: Intravenous  Anesthetic plan and risks discussed with: Patient

## 2022-11-02 NOTE — PROGRESS NOTES
Ortho Fast Track NP Note     Saw patient in [facility] PACU. Patient sitting in stretcher. Patient reports pain has remained minimal in post op period. From previous left total knee in September, patient states she was moderately to severely uncomfortable on POD 1 but oxycodone did provide sufficient pain control. Denies side effects to medications at that time. Patient remains hopeful for same day discharge following today's R TKA. Has voided. No N/V. No CP, no SOB. Most Recent Vitals:   Visit Vitals  BP (!) 151/79   Pulse 73   Temp 98 °F (36.7 °C)   Resp 15   Wt 71.7 kg (158 lb)   SpO2 96%   BMI 26.70 kg/m²         Exam:   ACE wrap to right leg c.d.i. Cryotherapy in place  Motor and sensation intact to LEs. DF/PF/EHL 5/5 bilaterally. Calves soft and supple; No pain with passive stretch     THERAPY:  Patient has been seen by PT, cleared; pending OT evaluation. Patient understands that clearance is necessary for discharge. CM: Pending home health arrangement      DISPO: Discharge teaching has not yet been provided by PACU RN.   - Anticoag: Aspirin 81 mg PO BID  - Pain:  Tylenol (has at home), oxycodone, Mobic. Narcan rx sent as well. Discussed scheduled vs as needed medications with patient.   - OBR: Pericolace/Miralax sent to pharmacy on file  - Follow up with Dr. Anitra Max in 3 weeks. Opportunity given for questions. Proceed with plan to discharge pending OT clearance, home health arrangement, ABX.              Kelly Desai NP  Available via [employer]

## 2022-11-02 NOTE — PROGRESS NOTES
Physical Therapy 11/2/2022    Orders received, chart reviewed and patient evaluated by physical therapy. Pt cleared for discharge from PT standpoint. Pt safe with standby guard for amb with RW and stairs with rail/cane. Pt performed supine right TKA exercise with supervision. Full evaluation to follow.       Kiesha Woo, PT

## 2022-11-02 NOTE — ANESTHESIA PROCEDURE NOTES
Spinal Block    Performed by: Kelsie Sharma CRNA  Authorized by: Uriel Fitzgerald DO     Pre-procedure:   Indications: primary anesthetic  Preanesthetic Checklist: patient identified, risks and benefits discussed, anesthesia consent, site marked, patient being monitored, timeout performed and fire risk safety assessment completed and verbalized    Timeout Time: 08:55 EDT      Spinal Block:   Patient Position:  Seated  Prep Region:  Lumbar  Prep: Betadine      Location:  L3-4  Technique:  Single shot  Local: mepivacaine (PF) (POLOCAINE) 2%  PF injection - Other   50 mg - 11/2/2022 9:00:00 AM    Med Admin Time: 11/2/2022 9:00 AM    Needle:   Needle Type:  Pencil-tip  Needle Gauge:  24 G  Attempts:  2      Events: CSF confirmed, no blood with aspiration and no paresthesia        Assessment:  Insertion:  Uncomplicated  Patient tolerance:  Patient tolerated the procedure well with no immediate complications

## 2022-11-02 NOTE — DISCHARGE INSTRUCTIONS
Discharge Instructions Knee Replacement  Dr. Alex Yap  Patient Name  Yesy Gutierrez  Date of procedure  11/2/2022    Procedure  Procedure(s):  RIGHT KNEE ARTHROPLASTY (VELYS) (FAST TRACK)  Surgeon  Surgeon(s) and Role:     * Dickson Houston MD - Primary  Date of discharge: [unfilled]  PCP: @PCP@    Follow up care  Follow up visit with Dr. Alex Yap in 4 weeks. Call [phone] Amy Mo to make an appointment. If Home Health has been arranged for you, they will call you to arrange dates/times for visits. Call them if you do not hear from them within 24 hours after you go home. Activity at home  Take a short walk every hour; except at night when sleeping. Do your Home Exercise Program 3 times every day. After exercising lie down and elevate your leg on pillows for 15-30 minutes to decrease swelling. Refer to your patient notebook for more information. Bathing and caring for your incision  You may take a shower with your waterproof dressing on your knee. The waterproof dressing is to stay on your knee for 7 days. On the 7th day have someone gently peel the dressing off by lifting the edge and stretching it to break the seal.  You may then leave your incision open to air unless you see drainage from your knee. Preventing blood clots  Take Aspirin 81mg twice each day for one month (30 days) following surgery. Call Dr. Alex Yap for signs of a blood clot in your leg: calf pain, tenderness, redness, swelling of lower leg   Preventing lung congestion  Use your incentive spirometer 4 times a day; do 10 repetitions each time  Remember to keep the small blue ball between the two arrows when taking a slow, deep breath   Pain Management  Get up and walk a short distance to relieve pain and stiffness. Place ice wrap on your knee except when you are walking. The gel ice packs should be changed about every 4 hours. Elevate your leg on pillows for 15-30 minutes.    Pain Medications  Take Tylenol 650mg (take two 325mg tablets) every 6 hours for the next 4 weeks. Take Meloxicam (Mobic) every day as prescribed to decrease swelling and inflammation. Do not take Meloxicam if you have had stomach ulcers. Take Famotidine (Pepcid) 20mg twice a day to prevent an upset stomach (if taking Aspirin and/or Meloxicam). If needed,  take Oxycodone 5mg (narcotic pain pill). Take Oxycodone only if needed and stop once your pain is tolerable. Take all medications with a small amount of food. As your pain decreases, take the narcotics less often or take ½ of a pill. Call Dr. Mati Breaux if you have side effects from your narcotic pain medication: itching, drowsiness, dizziness, upset stomach, dry mouth, constipation or if you medication is not relieving your pain. Diet after surgery  You may resume your normal diet. Include vegetables, fruit, whole grains, lean meats, and low-fat dairy products. Eat food high in fiber. Drink plenty of fluids, including 8 cups of water daily. Take a stool softener (Senokot-s or Colace) to prevent constipation. If constipation occurs you may take a laxative (Milk of Magnesia, Dulcolax tablets). Avoid after surgery  Do not take any over-the-counter medication for pain except Tylenol  Do not take more than 3000mg (3 Grams) of Tylenol in 24 hours  Do not drink alcoholic beverages  Do not smoke  Do not drive until seen for follow up appointment  Do not place frozen gel pack directly on your skin. It can cause frostbite. Do not take a tub bath, swim or get in a hot tub for 8 weeks  Prevention of falls and safety at home  Set up an area where you can rest comfortably leaving space around furniture to allow you to walk with your walker. Keep stairs, hallways and bathrooms well lit; especially at night. Arrange for care for your pets  Keep your home free of clutter.    Call Dr. Mati Breaux at [phone] for:  Pain that is not relieved by pain medication, ice and activity  Side effects of medications  Increased/spread of bruising  Warning signs of infection:  persistent fever greater than 100 degrees  shaking or chills  increased redness, tenderness, swelling or drainage from incision  increased pain during activity or rest  Warning signs of a blood clot in your leg:  increased pain in your calf  tenderness or redness  increased swelling or knee, calf, ankle or foot    Call [phone] after 5pm or on a weekend. The on call physician will return your phone call  Call your Primary Care Doctor for:   Concerns about your medical conditions such as diabetes, high blood pressure, asthma, congestive heart failure  Blood sugars greater than 180  Persistent headache or dizziness  Coughing or congestion  Constipation or diarrhea  Burning when you go to the bathroom  Abnormal heart rate (fast or  slow)      Call 911 and go to the nearest hospital for:   Sudden increased shortness of breath  Sudden onset of chest pain  Difficulty breathing  Localized chest pain with coughing or taking a deep breath     ______________________________________________________________________    Anesthesia Discharge Instructions    After general anesthesia or intervenous sedation, for 24 hours or while taking prescription Narcotics:  Limit your activities  Do not drive or operate hazardous machinery  If you have not urinated within 8 hours after discharge, please contact your surgeon on call. Do not make important personal or business decisions  Do not drink alcoholic beverages    Report the following to your surgeon:  Excessive pain, swelling, redness or odor of or around the surgical area  Temperature over 100.5 degrees  Nausea and vomiting lasting longer than 4 hours or if unable to take medication  Any signs of decreased circulation or nerve impairment to extremity:  Change in color, persistent numbness, tingling, coldness or increased pain.   Any questions

## 2022-11-02 NOTE — OP NOTES
Name: Ezra Sylvester  MRN:  [id number]  : 1965  Age:  59 y.o. Surgery Date: 2022      OPERATIVE REPORT - RIGHT TOTAL KNEE REPLACEMENT    PREOPERATIVE DIAGNOSIS: Osteoarthritis, right knee. POSTOPERATIVE DIAGNOSIS: Osteoarthritis, right knee. PROCEDURE PERFORMED: Computer assisted Right total knee arthroplasty Velys Robot- SUBVASTUS    SURGEON: Ty Rice MD    FIRST ASSISTANT:  Rich Chiu PA-C    ANESTHESIA: Spinal    PRE-OP ANTIBIOTIC: Ancef 2g    COMPLICATIONS: None. ESTIMATED BLOOD LOSS: 100 mL. SPECIMENS REMOVED: None. COMPONENTS IMPLANTED:   Implant Name Type Inv. Item Serial No.  Lot No. LRB No. Used Action   CEMENT BNE 40GM FULL DOSE PMMA W/ GENT HI VISC RADPQ LNG - SNA  CEMENT BNE 40GM FULL DOSE PMMA W/ GENT HI VISC RADPQ LNG NA [facility] [id number] Right 1 Implanted   ATTUNE FEM SALEEM POR CR RT SZ 6 - SNA  ATTUNE FEM SALEEM POR CR RT SZ 6 NA [employer] [facility] [id number] Right 1 Implanted   COMPONENT PAT [id number] KNEE POLY DOME BRIDGET MEDIALIZED ATTUNE - SNA  COMPONENT PAT [id number] KNEE POLY DOME BRIDGET MEDIALIZED ATTUNE NA [facility] [employer] [facility] [id number] Right 1 Implanted   BEARING TIB FIX 4 KNEE BASE POROUS ATTUNE AFFIXIUM - SNA  BEARING TIB FIX 4 KNEE BASE POROUS ATTUNE AFFIXIUM NA [facility] [employer] [facility] [id number] Right 1 Implanted   INSERT TIB FIX BEAR 6 6 MM RT MEDL KNEE STBL AOX ATTUNE - SNA  INSERT TIB FIX BEAR 6 6 MM RT MEDL KNEE STBL AOX ATTUNE NA [facility] [employer] [facility] [id number] Right 1 Implanted       INDICATIONS: The patient is an 59 yrs female with progressive debilitating right knee pain due to severe osteoarthritis. Symptoms have progressed despite comprehensive conservative treatment and they presents for right total knee replacement. Risks, benefits, alternatives of the procedure were reviewed in detail and the patient desired to proceed.  Risks including bleeding, infection, damage to surrounding structures, blood clots, pulmonary embolism, and death were discussed. The patient understood understands the increased risk for perioperative medical complications due to their medical comorbidities. DESCRIPTION OF PROCEDURE:DESCRIPTION OF PROCEDURE: Epidural/spinal anesthesia was initiated. Two grams of cefazolin were administered within 30 minutes of incision. The right lower extremity was prepped and draped in the usual sterile fashion. The skin was covered with Ioban occlusive dressing. A tourniquet was not used. A midline skin incision was made with a 10 blade and small flaps were elevated. A SUBVASTUS arthrotomy was made to the knee. I released the ACL and menisci and performed a limited medial release. I then obtained all anatomic landmarks using the Cloud[employer] system. The tibia was subluxed and I carried out a tibial resection with approximately 2-3 mm from the low side . The meniscal remnants were removed. I then placed the tensor  and took the knee through a range of motion. I utlized the balance curve to modify our femoral cuts. Anterior, posterior, and chamfer cuts were carried out using the [employer] robot. I then prepared the femur for the planned size and drilled lug holes. The tibial was then sized and correct rotation was identified using the medial 1/3 of the tibial tubercle as a landmark. The tibia was prepared using a drill followed by a keel punch. Trials were placed. The patella was sized using a caliper and an appropriate resection  was performed. Lug holes were drilled and a trial was fitted. The knee tracked well with all trial implants. The trials were then removed. Bony surfaces were drilled, lavaged, and dried. All components were cemented. Excess cement was removed. Polyethylene component was placed. After the cement had fully cured, the knee was ranged and  irrigated copiously with pulsatile lavage.      All surrounding soft tissues were infiltrated with local anesthetic. The arthrotomy  was closed with running quill suture and 0 vicryl suture. Skin and subcutaneous tissues were irrigated and closed in standard fashion with 2-0 vicryl and 3-0 monocryl. An aquacel dressing was placed. The patient underwent straight catheterization at the end of the case. Ankur Bolanos was critical for banks portions of the case including retractor management, wound closure, and dressing application. There was no one else available to perform these specific duties. There were no complications. The procedure was a robotic assisted  RIGHT TOTAL KNEE REPLACEMENT. No specimens were obtained/sent. The patient was transferred to the recovery room in stable condition.       Linh Evans MD

## 2022-11-02 NOTE — H&P
Date of Surgery Update:  Cisco Glez was seen and examined. History and physical has been reviewed. The patient has been examined. There have been no significant clinical changes since the completion of the originally dated History and Physical.  Patient identified by surgeon; surgical site was confirmed by patient and surgeon.     Signed By: Ree Velasquez MD     November 2, 2022 7:43 AM

## 2022-11-29 ENCOUNTER — OFFICE VISIT (OUTPATIENT)
Dept: ORTHOPEDIC SURGERY | Age: 57
End: 2022-11-29
Payer: MEDICARE

## 2022-11-29 VITALS — WEIGHT: 158 LBS | BODY MASS INDEX: 26.33 KG/M2 | HEIGHT: 65 IN

## 2022-11-29 DIAGNOSIS — Z96.651 STATUS POST RIGHT KNEE REPLACEMENT: Primary | ICD-10-CM

## 2022-11-29 RX ORDER — METHYLPREDNISOLONE 4 MG/1
TABLET ORAL
Qty: 1 DOSE PACK | Refills: 0 | Status: SHIPPED | OUTPATIENT
Start: 2022-11-29

## 2022-11-29 NOTE — PROGRESS NOTES
Glenn Peoples (: 1965) is a 59 y.o. female patient, here for evaluation of the following chief complaint(s):  Surgical Follow-up (Right knee follow up/)       ASSESSMENT/PLAN:  Below is the assessment and plan developed based on review of pertinent history, physical exam, labs, studies, and medications. Radiographs reviewed including 3 views of the right knee. Status post right knee arthroplasty. No evidence of aseptic loosening. Overall alignment is appropriate. Patella tracking centrally. Assessment and plan: Status post right knee arthroplasty. The patient is very happy with  progress and is doing well. She does have a rash which is relatively new. I am going to call in a Medrol Dosepak. She had her other knee done with the same exact implants and had no issues. I would like to see them back in 6 weeks. 1. Status post right knee replacement  -     XR KNEE RT 3 V; Future      Encounter Diagnosis   Name Primary? Status post right knee replacement Yes        No follow-ups on file. SUBJECTIVE/OBJECTIVE:  Glenn Peoples (: 1965) is a 59 y.o. female who presents today for the following:  Chief Complaint   Patient presents with    Surgical Follow-up     Right knee follow up         Status post total knee. 4 weeks postop. She is doing very well. She has mild stiffness in the morning. She has no pain with twisting. She has no pain with straightening. She has mild pain going up and down stairs. She has no pain standing upright. She has mild pain rising from sitting. She has no pain bending over. She stopped using a cane and a walker on . IMAGING:  XR Results (most recent):  Results from Appointment encounter on 22    XR KNEE RT 3 V    Narrative  Radiographs reviewed including 3 views of the right knee. Status post right knee arthroplasty. No evidence of aseptic loosening. Overall alignment is appropriate. Patella tracking centrally.        Allergies Allergen Reactions    Pcn [Penicillins] Unknown (comments)     NO REPORTED SEVERE NON-IGE-MEDIATED REACTIONS    Penicillin G Rash     NO REPORTED SEVERE NON-IGE-MEDIATED REACTON       Current Outpatient Medications   Medication Sig    acetaminophen (TYLENOL) 650 mg TbER Take 650 mg by mouth every eight (8) hours. senna-docusate (PERICOLACE) 8.6-50 mg per tablet Take 1 Tablet by mouth two (2) times a day for 30 days. aspirin delayed-release 81 mg tablet Take 1 Tablet by mouth two (2) times a day for 30 days. meloxicam (MOBIC) 7.5 mg tablet Take 2 Tablets by mouth daily for 30 days. famotidine (PEPCID) 20 mg tablet Take 1 Tablet by mouth two (2) times a day for 30 days. senna (SENOKOT) 8.6 mg tablet Take 1 Tablet by mouth daily. quetiapine fumarate (QUETIAPINE PO) Take 1 Tablet by mouth nightly. multivit-min/iron/folic/lutein (MULTIVITAMIN WOMEN 50 PLUS PO) Take 1 Tablet by mouth every morning. Lacto no.76/Bifido/FOS/larch (WOMEN'S PROBIOTIC PO) Take 10 mg by mouth daily. magnesium citrate 100 mg cap Take 100 mg by mouth every morning. cholecalciferol, vitamin D3, (VITAMIN D3 PO) Take 1 Tablet by mouth daily. ascorbic acid, vitamin C, (VITAMIN C) 500 mg tablet Take 1,000 mg by mouth daily. LORazepam (ATIVAN) 1 mg tablet Take 1 mg by mouth as needed. No current facility-administered medications for this visit.        Past Medical History:   Diagnosis Date    Aggressive outburst     Anxiety disorder     Arthritis 2008    Knees and hands    Artificial menopause     Bipolar affective disorder ([address] 01/01/2007    Endometriosis     Ill-defined condition 10/2022    COVID    Leukopenia     Psychiatric disorder [id number]    Bipolar episodes    Psychotic disorder ([address]     Substance abuse ([facility] [address]         Past Surgical History:   Procedure Laterality Date    HX BREAST BIOPSY Right     Benign    HX GI      HX KNEE REPLACEMENT Left 09/2022    HX ORTHOPAEDIC Right 03/2018    REPAIR BROKEN FEMUR/HIP    HX SALPINGO-OOPHORECTOMY Bilateral 2010       Family History   Problem Relation Age of Onset    OSTEOARTHRITIS Mother         Had both knees replaced    Heart Attack Father         X 3    Hypertension Father     Kidney Disease Father     Hypertension Sister     Hypertension Brother     High Cholesterol Brother     Hypertension Brother     Heart Attack Brother     OSTEOARTHRITIS Maternal Aunt         Both knees replaced    Cancer Maternal Grandmother         lung    OSTEOARTHRITIS Maternal Grandmother         Both knees replaced    Alzheimer's Disease Paternal Grandmother     Cancer Paternal Grandfather         Lung cancer    Anesth Problems Neg Hx         Social History     Tobacco Use    Smoking status: Never    Smokeless tobacco: Never   Substance Use Topics    Alcohol use: Yes     Alcohol/week: 4.0 standard drinks     Types: 4 Shots of liquor per week        All systems reviewed x 12 and were negative with the exception of None      No flowsheet data found. Vitals:  Ht 5' 4.5\" (1.638 m)   Wt 158 lb (71.7 kg)   BMI 26.70 kg/m²    Body mass index is 26.7 kg/m². Physical Exam  Gen: NAD    Resp: Non-labored    RLE: Midline incision is healing well with no evidence of infection. No erythema. Range of motion 0-125°. No extensor lag. Grossly stable in the coronal and sagittal planes. No calf tenderness. No evidence of a DVT. Motor grossly intact with 5 out of 5 strength. Sensation intact to light touch throughout. Palpable pedal pulses. An electronic signature was used to authenticate this note.   -- Joan Khan MD

## 2023-01-12 ENCOUNTER — OFFICE VISIT (OUTPATIENT)
Dept: ORTHOPEDIC SURGERY | Age: 58
End: 2023-01-12
Payer: MEDICARE

## 2023-01-12 VITALS — BODY MASS INDEX: 26.33 KG/M2 | HEIGHT: 65 IN | WEIGHT: 158 LBS

## 2023-01-12 DIAGNOSIS — Z98.890 STATUS POST SURGERY: Primary | ICD-10-CM

## 2023-01-12 PROCEDURE — 99024 POSTOP FOLLOW-UP VISIT: CPT | Performed by: ORTHOPAEDIC SURGERY

## 2023-01-12 NOTE — PROGRESS NOTES
Africa Farnsworth (: 1965) is a 59 y.o. female patient, here for evaluation of the following chief complaint(s):  Surgical Follow-up (Right knee follow up/)       ASSESSMENT/PLAN:  Below is the assessment and plan developed based on review of pertinent history, physical exam, labs, studies, and medications. Radiographs reviewed including 3 views of the right knee. Status post right knee arthroplasty. No evidence of aseptic loosening. Overall alignment is appropriate. Patella tracking centrally. Assessment and plan: Status post right knee arthroplasty. The patient is very happy with  progress and is doing well. I would like to see them back in 9 months      1. Status post surgery      Encounter Diagnosis   Name Primary? Status post surgery Yes        No follow-ups on file. SUBJECTIVE/OBJECTIVE:  Africa Farnsworth (: 1965) is a 59 y.o. female who presents today for the following:  Chief Complaint   Patient presents with    Surgical Follow-up     Right knee follow up         Status post right total knee. She is about 10 weeks postop. She is doing fantastic. She says she has some very mild stiffness in the morning. She has no pain while twisting. She has no pain straightening her knee. She has mild pain with stairs. She has no pain standing upright. She has mild pain rising from sitting and no pain bending over to  an object. She use a cane for 4 days postop and then did not use an assist device after the    IMAGING:  XR Results (most recent):  Results from Appointment encounter on 22    XR KNEE RT 3 V    Narrative  Radiographs reviewed including 3 views of the right knee. Status post right knee arthroplasty. No evidence of aseptic loosening. Overall alignment is appropriate. Patella tracking centrally.        Allergies   Allergen Reactions    Pcn [Penicillins] Unknown (comments)     NO REPORTED SEVERE NON-IGE-MEDIATED REACTIONS    Penicillin G Rash     NO REPORTED SEVERE NON-IGE-MEDIATED REACTON       Current Outpatient Medications   Medication Sig    acetaminophen (TYLENOL) 650 mg TbER Take 650 mg by mouth every eight (8) hours. senna (SENOKOT) 8.6 mg tablet Take 1 Tablet by mouth daily. quetiapine fumarate (QUETIAPINE PO) Take 1 Tablet by mouth nightly. multivit-min/iron/folic/lutein (MULTIVITAMIN WOMEN 50 PLUS PO) Take 1 Tablet by mouth every morning. Lacto no.76/Bifido/FOS/larch (WOMEN'S PROBIOTIC PO) Take 10 mg by mouth daily. magnesium citrate 100 mg cap Take 100 mg by mouth every morning. cholecalciferol, vitamin D3, (VITAMIN D3 PO) Take 1 Tablet by mouth daily. ascorbic acid, vitamin C, (VITAMIN C) 500 mg tablet Take 1,000 mg by mouth daily. LORazepam (ATIVAN) 1 mg tablet Take 1 mg by mouth as needed. No current facility-administered medications for this visit.        Past Medical History:   Diagnosis Date    Aggressive outburst     Anxiety disorder     Arthritis 2008    Knees and hands    Artificial menopause     Bipolar affective disorder [address] [address] 01/01/2007    Endometriosis     Ill-defined condition 10/2022    COVID    Leukopenia     Psychiatric disorder [id number]    Bipolar episodes    Psychotic disorder ([facility] [address]     Substance abuse ([facility] [address]         Past Surgical History:   Procedure Laterality Date    HX BREAST BIOPSY Right     Benign    HX GI      HX HIP REPLACEMENT  3/15/2018    HX KNEE REPLACEMENT Left 09/2022    HX ORTHOPAEDIC Right 03/2018    REPAIR BROKEN FEMUR/HIP    HX SALPINGO-OOPHORECTOMY Bilateral 2010       Family History   Problem Relation Age of Onset    OSTEOARTHRITIS Mother         Had both knees replaced    Heart Attack Father         X 3    Hypertension Father         Angina and 2 heart attacks    Kidney Disease Father     Hypertension Sister     Hypertension Brother     High Cholesterol Brother     Hypertension Brother         Heart attack    Heart Attack Brother     OSTEOARTHRITIS Maternal Aunt         Both knees replaced Cancer Maternal Grandmother         lung    OSTEOARTHRITIS Maternal Grandmother         Both knees replaced    Alzheimer's Disease Paternal Grandmother     Cancer Paternal Grandfather         Lung cancer    Anesth Problems Neg Hx         Social History     Tobacco Use    Smoking status: Never    Smokeless tobacco: Never   Substance Use Topics    Alcohol use: Yes     Alcohol/week: 4.0 standard drinks     Types: 4 Shots of liquor per week     Comment: 0-1 a day        All systems reviewed x 12 and were negative with the exception of None      No flowsheet data found. Vitals:  Ht 5' 4.5\" (1.638 m)   Wt 158 lb (71.7 kg)   BMI 26.70 kg/m²    Body mass index is 26.7 kg/m². Physical Exam    Gen: NAD    Resp: Non-labored    RLE: Midline incision is healing well with no evidence of infection. No erythema. Range of motion 0-130°. No extensor lag. Grossly stable in the coronal and sagittal planes. No calf tenderness. No evidence of a DVT. Motor grossly intact with 5 out of 5 strength. Sensation intact to light touch throughout. Palpable pedal pulses. An electronic signature was used to authenticate this note.   -- Bhavesh Bergman MD

## 2023-04-19 DIAGNOSIS — Z96.651 STATUS POST RIGHT KNEE REPLACEMENT: Primary | ICD-10-CM

## 2023-04-19 RX ORDER — CLINDAMYCIN HYDROCHLORIDE 300 MG/1
CAPSULE ORAL
Qty: 2 CAPSULE | Refills: 2 | Status: SHIPPED | OUTPATIENT
Start: 2023-04-19

## 2023-05-17 ENCOUNTER — OFFICE VISIT (OUTPATIENT)
Age: 58
End: 2023-05-17
Payer: MEDICARE

## 2023-05-17 VITALS
DIASTOLIC BLOOD PRESSURE: 80 MMHG | HEART RATE: 70 BPM | OXYGEN SATURATION: 97 % | TEMPERATURE: 98.1 F | HEIGHT: 65 IN | WEIGHT: 155.4 LBS | BODY MASS INDEX: 25.89 KG/M2 | RESPIRATION RATE: 18 BRPM | SYSTOLIC BLOOD PRESSURE: 136 MMHG

## 2023-05-17 DIAGNOSIS — Z12.83 SKIN EXAM, SCREENING FOR CANCER: ICD-10-CM

## 2023-05-17 DIAGNOSIS — Z12.31 ENCOUNTER FOR SCREENING MAMMOGRAM FOR MALIGNANT NEOPLASM OF BREAST: ICD-10-CM

## 2023-05-17 DIAGNOSIS — Z00.00 ENCOUNTER FOR HEALTH MAINTENANCE EXAMINATION: Primary | ICD-10-CM

## 2023-05-17 DIAGNOSIS — Z12.11 SCREENING FOR COLON CANCER: ICD-10-CM

## 2023-05-17 PROBLEM — F31.9 BIPOLAR AFFECTIVE DISORDER, CURRENTLY ACTIVE (HCC): Status: ACTIVE | Noted: 2018-03-16

## 2023-05-17 LAB
ALBUMIN SERPL-MCNC: 4.4 G/DL (ref 3.5–5)
ALBUMIN/GLOB SERPL: 1.4 (ref 1.1–2.2)
ALP SERPL-CCNC: 96 U/L (ref 45–117)
ALT SERPL-CCNC: 21 U/L (ref 12–78)
ANION GAP SERPL CALC-SCNC: 3 MMOL/L (ref 5–15)
AST SERPL-CCNC: 15 U/L (ref 15–37)
BILIRUB SERPL-MCNC: 0.4 MG/DL (ref 0.2–1)
BUN SERPL-MCNC: 14 MG/DL (ref 6–20)
BUN/CREAT SERPL: 20 (ref 12–20)
CALCIUM SERPL-MCNC: 9 MG/DL (ref 8.5–10.1)
CHLORIDE SERPL-SCNC: 106 MMOL/L (ref 97–108)
CHOLEST SERPL-MCNC: 234 MG/DL
CO2 SERPL-SCNC: 30 MMOL/L (ref 21–32)
CREAT SERPL-MCNC: 0.7 MG/DL (ref 0.55–1.02)
ERYTHROCYTE [DISTWIDTH] IN BLOOD BY AUTOMATED COUNT: 13.4 % (ref 11.5–14.5)
GLOBULIN SER CALC-MCNC: 3.2 G/DL (ref 2–4)
GLUCOSE SERPL-MCNC: 88 MG/DL (ref 65–100)
HCT VFR BLD AUTO: 40.9 % (ref 35–47)
HDLC SERPL-MCNC: 73 MG/DL
HDLC SERPL: 3.2 (ref 0–5)
HGB BLD-MCNC: 12.7 G/DL (ref 11.5–16)
LDLC SERPL CALC-MCNC: 139.4 MG/DL (ref 0–100)
MCH RBC QN AUTO: 28.8 PG (ref 26–34)
MCHC RBC AUTO-ENTMCNC: 31.1 G/DL (ref 30–36.5)
MCV RBC AUTO: 92.7 FL (ref 80–99)
NRBC # BLD: 0 K/UL (ref 0–0.01)
NRBC BLD-RTO: 0 PER 100 WBC
PLATELET # BLD AUTO: 295 K/UL (ref 150–400)
PMV BLD AUTO: 9.1 FL (ref 8.9–12.9)
POTASSIUM SERPL-SCNC: 4 MMOL/L (ref 3.5–5.1)
PROT SERPL-MCNC: 7.6 G/DL (ref 6.4–8.2)
RBC # BLD AUTO: 4.41 M/UL (ref 3.8–5.2)
SODIUM SERPL-SCNC: 139 MMOL/L (ref 136–145)
TRIGL SERPL-MCNC: 108 MG/DL
VLDLC SERPL CALC-MCNC: 21.6 MG/DL
WBC # BLD AUTO: 4.8 K/UL (ref 3.6–11)

## 2023-05-17 PROCEDURE — 99396 PREV VISIT EST AGE 40-64: CPT | Performed by: FAMILY MEDICINE

## 2023-05-17 RX ORDER — QUETIAPINE FUMARATE 300 MG/1
2 TABLET, FILM COATED ORAL NIGHTLY
COMMUNITY

## 2023-05-17 SDOH — ECONOMIC STABILITY: FOOD INSECURITY: WITHIN THE PAST 12 MONTHS, THE FOOD YOU BOUGHT JUST DIDN'T LAST AND YOU DIDN'T HAVE MONEY TO GET MORE.: NEVER TRUE

## 2023-05-17 SDOH — ECONOMIC STABILITY: INCOME INSECURITY: HOW HARD IS IT FOR YOU TO PAY FOR THE VERY BASICS LIKE FOOD, HOUSING, MEDICAL CARE, AND HEATING?: NOT VERY HARD

## 2023-05-17 SDOH — ECONOMIC STABILITY: FOOD INSECURITY: WITHIN THE PAST 12 MONTHS, YOU WORRIED THAT YOUR FOOD WOULD RUN OUT BEFORE YOU GOT MONEY TO BUY MORE.: NEVER TRUE

## 2023-05-17 SDOH — ECONOMIC STABILITY: HOUSING INSECURITY
IN THE LAST 12 MONTHS, WAS THERE A TIME WHEN YOU DID NOT HAVE A STEADY PLACE TO SLEEP OR SLEPT IN A SHELTER (INCLUDING NOW)?: NO

## 2023-05-17 ASSESSMENT — ENCOUNTER SYMPTOMS
NAUSEA: 0
DIARRHEA: 0
VOMITING: 0
CONSTIPATION: 1
CONSTIPATION: 0
ABDOMINAL PAIN: 0

## 2023-05-17 ASSESSMENT — PATIENT HEALTH QUESTIONNAIRE - PHQ9
4. FEELING TIRED OR HAVING LITTLE ENERGY: 0
SUM OF ALL RESPONSES TO PHQ QUESTIONS 1-9: 0
2. FEELING DOWN, DEPRESSED OR HOPELESS: 0
7. TROUBLE CONCENTRATING ON THINGS, SUCH AS READING THE NEWSPAPER OR WATCHING TELEVISION: 0
9. THOUGHTS THAT YOU WOULD BE BETTER OFF DEAD, OR OF HURTING YOURSELF: 0
SUM OF ALL RESPONSES TO PHQ9 QUESTIONS 1 & 2: 0
3. TROUBLE FALLING OR STAYING ASLEEP: 0
6. FEELING BAD ABOUT YOURSELF - OR THAT YOU ARE A FAILURE OR HAVE LET YOURSELF OR YOUR FAMILY DOWN: 0
SUM OF ALL RESPONSES TO PHQ QUESTIONS 1-9: 0
10. IF YOU CHECKED OFF ANY PROBLEMS, HOW DIFFICULT HAVE THESE PROBLEMS MADE IT FOR YOU TO DO YOUR WORK, TAKE CARE OF THINGS AT HOME, OR GET ALONG WITH OTHER PEOPLE: 0
SUM OF ALL RESPONSES TO PHQ QUESTIONS 1-9: 0
SUM OF ALL RESPONSES TO PHQ QUESTIONS 1-9: 0
8. MOVING OR SPEAKING SO SLOWLY THAT OTHER PEOPLE COULD HAVE NOTICED. OR THE OPPOSITE, BEING SO FIGETY OR RESTLESS THAT YOU HAVE BEEN MOVING AROUND A LOT MORE THAN USUAL: 0
1. LITTLE INTEREST OR PLEASURE IN DOING THINGS: 0
5. POOR APPETITE OR OVEREATING: 0

## 2023-05-17 NOTE — PROGRESS NOTES
HPI:  Brennon Platt is a 24[address] [facility] y.o. female presenting for well woman exam.     See MS3 note for details, Carlos Alissa, MS3    Health Maintenance - reviewed:  Pap (age 21-65): Now followed by [facility]; pap up to date. Mammogram (age 54-69): Last mammogram in 2010. Bad experience at that time so never followed up. Open to resuming screening. Colonoscopy (age 54-65): Last done 10 years ago. Low Dose CT Lung (age 46-80 with 30ppy hx and current smoker or quit < 15 yrs): Not indicated    DEXA (>/= 73 yo or sooner with RF): Not yet indicated    HIV screening: Negative when previously checked      Allergies- reviewed: Allergies   Allergen Reactions    Penicillins Rash     Other reaction(s): Unknown (comments)  NO REPORTED SEVERE NON-IGE-MEDIATED REACTIONS  NO REPORTED SEVERE NON-IGE-MEDIATED REACTON           Medications- reviewed:   Current Outpatient Medications   Medication Sig    QUEtiapine (SEROQUEL) 300 MG tablet Take 2 tablets by mouth nightly    Multiple Vitamins-Minerals (WOMENS MULTIVITAMIN PO) Take 1 tablet by mouth every morning    Misc Natural Products (COLON HERBAL CLEANSER PO) Take by mouth as needed    LORazepam (ATIVAN) 1 MG tablet Take 1 tablet by mouth as needed. No current facility-administered medications for this visit.          Past Medical History- reviewed:  Past Medical History:   Diagnosis Date    Aggressive outburst     Anxiety disorder     Arthritis 2008    Knees and hands    Artificial menopause     Bipolar affective disorder ([address] 01/01/2007    Endometriosis     Ill-defined condition 10/2022    COVID    Leukopenia     Psychiatric disorder [id number]    Bipolar episodes    Psychotic disorder ([address]     Substance abuse ([address]          Past Surgical History- reviewed:   Past Surgical History:   Procedure Laterality Date    BREAST BIOPSY Right     Benign    GI      ORTHOPEDIC SURGERY Right 03/2018    REPAIR BROKEN FEMUR/HIP    SALPINGO-OOPHORECTOMY Bilateral 2010    TOTAL HIP
Type:   Eval and Treat     Referral Reason:   Specialty Services Required     Referred to Provider:   Brianna Mariano MD     Requested Specialty:   Gastroenterology     Number of Visits Requested:   1       I have discussed the diagnosis with the patient and the intended plan as seen in the above orders. The patient has received an after-visit summary and questions were answered concerning future plans. Informed pt to return to the office if new symptoms arise.       Electronically Signed: Julio Couch, KAY

## 2024-05-15 ENCOUNTER — TELEPHONE (OUTPATIENT)
Age: 59
End: 2024-05-15

## 2024-05-15 NOTE — TELEPHONE ENCOUNTER
Patient called earlier today requesting to get a tetanus shot due to [place] months. Patient was informed that we do not do tetanus shot but she asked if we could make an accomodation. After speaking with the supervisor we were told that we can not do a tetanus shot and she could have this completed at [facility] pharmacy. Patient is due for a physical as well so if she would like any vaccines that needs to be updated she would need to schedule an appointment for a physical.    I left patient a voicemail for her to give us a call back so I can relay this information.

## 2024-05-16 ENCOUNTER — TELEPHONE (OUTPATIENT)
Age: 59
End: 2024-05-16

## 2024-05-16 NOTE — TELEPHONE ENCOUNTER
Patient has an upcoming appointment with Dr. Herndon on 05/23/2024, will forward message to Dr. Herndon to assist with order labs for upcoming appointment    ----- Message from Tiffanie Zaidi sent at 5/15/2024 12:40 PM EDT -----  Subject: Referral Request    Reason for referral request? bloodwork for full lipid panel for wellness   And cbc .  Provider patient wants to be referred to(if known):     Provider Phone Number(if known):    Additional Information for Provider?   ---------------------------------------------------------------------------  --------------  CALL BACK INFO    [phone]; OK to leave message on voicemail  ---------------------------------------------------------------------------  --------------

## 2024-05-23 ENCOUNTER — OFFICE VISIT (OUTPATIENT)
Age: 59
End: 2024-05-23
Payer: MEDICARE

## 2024-05-23 VITALS
BODY MASS INDEX: 25.44 KG/M2 | OXYGEN SATURATION: 95 % | TEMPERATURE: 98.2 F | SYSTOLIC BLOOD PRESSURE: 108 MMHG | HEART RATE: 71 BPM | HEIGHT: 64 IN | DIASTOLIC BLOOD PRESSURE: 69 MMHG | RESPIRATION RATE: 18 BRPM | WEIGHT: 149 LBS

## 2024-05-23 DIAGNOSIS — E66.3 OVERWEIGHT (BMI 25.0-29.9): ICD-10-CM

## 2024-05-23 DIAGNOSIS — F31.2 BIPOLAR AFFECTIVE DISORDER, MANIC, SEVERE, WITH PSYCHOTIC BEHAVIOR (HCC): ICD-10-CM

## 2024-05-23 DIAGNOSIS — Z00.00 MEDICARE ANNUAL WELLNESS VISIT, SUBSEQUENT: Primary | ICD-10-CM

## 2024-05-23 PROBLEM — F31.9 BIPOLAR AFFECTIVE DISORDER, CURRENTLY ACTIVE (HCC): Status: RESOLVED | Noted: 2018-03-16 | Resolved: 2024-05-23

## 2024-05-23 LAB
ALBUMIN SERPL-MCNC: 4.2 G/DL (ref 3.5–5)
ALBUMIN/GLOB SERPL: 1.4 (ref 1.1–2.2)
ALP SERPL-CCNC: 82 U/L (ref 45–117)
ALT SERPL-CCNC: 19 U/L (ref 12–78)
ANION GAP SERPL CALC-SCNC: 3 MMOL/L (ref 5–15)
AST SERPL-CCNC: 16 U/L (ref 15–37)
BILIRUB SERPL-MCNC: 0.5 MG/DL (ref 0.2–1)
BUN SERPL-MCNC: 9 MG/DL (ref 6–20)
BUN/CREAT SERPL: 13 (ref 12–20)
CALCIUM SERPL-MCNC: 9.3 MG/DL (ref 8.5–10.1)
CHLORIDE SERPL-SCNC: 105 MMOL/L (ref 97–108)
CHOLEST SERPL-MCNC: 207 MG/DL
CO2 SERPL-SCNC: 30 MMOL/L (ref 21–32)
CREAT SERPL-MCNC: 0.72 MG/DL (ref 0.55–1.02)
ERYTHROCYTE [DISTWIDTH] IN BLOOD BY AUTOMATED COUNT: 12.7 % (ref 11.5–14.5)
GLOBULIN SER CALC-MCNC: 3 G/DL (ref 2–4)
GLUCOSE SERPL-MCNC: 92 MG/DL (ref 65–100)
HCT VFR BLD AUTO: 35.9 % (ref 35–47)
HDLC SERPL-MCNC: 78 MG/DL
HDLC SERPL: 2.7 (ref 0–5)
HGB BLD-MCNC: 11.8 G/DL (ref 11.5–16)
LDLC SERPL CALC-MCNC: 113 MG/DL (ref 0–100)
MCH RBC QN AUTO: 29.4 PG (ref 26–34)
MCHC RBC AUTO-ENTMCNC: 32.9 G/DL (ref 30–36.5)
MCV RBC AUTO: 89.3 FL (ref 80–99)
NRBC # BLD: 0 K/UL (ref 0–0.01)
NRBC BLD-RTO: 0 PER 100 WBC
PLATELET # BLD AUTO: 299 K/UL (ref 150–400)
PMV BLD AUTO: 8.8 FL (ref 8.9–12.9)
POTASSIUM SERPL-SCNC: 4 MMOL/L (ref 3.5–5.1)
PROT SERPL-MCNC: 7.2 G/DL (ref 6.4–8.2)
RBC # BLD AUTO: 4.02 M/UL (ref 3.8–5.2)
SODIUM SERPL-SCNC: 138 MMOL/L (ref 136–145)
TRIGL SERPL-MCNC: 80 MG/DL
VLDLC SERPL CALC-MCNC: 16 MG/DL
WBC # BLD AUTO: 3.9 K/UL (ref 3.6–11)

## 2024-05-23 PROCEDURE — G0439 PPPS, SUBSEQ VISIT: HCPCS

## 2024-05-23 RX ORDER — AMOXICILLIN 500 MG
1 CAPSULE ORAL DAILY
COMMUNITY

## 2024-05-23 SDOH — ECONOMIC STABILITY: INCOME INSECURITY: HOW HARD IS IT FOR YOU TO PAY FOR THE VERY BASICS LIKE FOOD, HOUSING, MEDICAL CARE, AND HEATING?: NOT HARD AT ALL

## 2024-05-23 SDOH — ECONOMIC STABILITY: FOOD INSECURITY: WITHIN THE PAST 12 MONTHS, YOU WORRIED THAT YOUR FOOD WOULD RUN OUT BEFORE YOU GOT MONEY TO BUY MORE.: NEVER TRUE

## 2024-05-23 SDOH — ECONOMIC STABILITY: FOOD INSECURITY: WITHIN THE PAST 12 MONTHS, THE FOOD YOU BOUGHT JUST DIDN'T LAST AND YOU DIDN'T HAVE MONEY TO GET MORE.: NEVER TRUE

## 2024-05-23 ASSESSMENT — PATIENT HEALTH QUESTIONNAIRE - PHQ9
7. TROUBLE CONCENTRATING ON THINGS, SUCH AS READING THE NEWSPAPER OR WATCHING TELEVISION: NOT AT ALL
SUM OF ALL RESPONSES TO PHQ QUESTIONS 1-9: 0
5. POOR APPETITE OR OVEREATING: NOT AT ALL
9. THOUGHTS THAT YOU WOULD BE BETTER OFF DEAD, OR OF HURTING YOURSELF: NOT AT ALL
SUM OF ALL RESPONSES TO PHQ QUESTIONS 1-9: 0
2. FEELING DOWN, DEPRESSED OR HOPELESS: NOT AT ALL
8. MOVING OR SPEAKING SO SLOWLY THAT OTHER PEOPLE COULD HAVE NOTICED. OR THE OPPOSITE, BEING SO FIGETY OR RESTLESS THAT YOU HAVE BEEN MOVING AROUND A LOT MORE THAN USUAL: NOT AT ALL
10. IF YOU CHECKED OFF ANY PROBLEMS, HOW DIFFICULT HAVE THESE PROBLEMS MADE IT FOR YOU TO DO YOUR WORK, TAKE CARE OF THINGS AT HOME, OR GET ALONG WITH OTHER PEOPLE: NOT DIFFICULT AT ALL
6. FEELING BAD ABOUT YOURSELF - OR THAT YOU ARE A FAILURE OR HAVE LET YOURSELF OR YOUR FAMILY DOWN: NOT AT ALL
SUM OF ALL RESPONSES TO PHQ QUESTIONS 1-9: 0
1. LITTLE INTEREST OR PLEASURE IN DOING THINGS: NOT AT ALL
4. FEELING TIRED OR HAVING LITTLE ENERGY: NOT AT ALL
SUM OF ALL RESPONSES TO PHQ9 QUESTIONS 1 & 2: 0
SUM OF ALL RESPONSES TO PHQ QUESTIONS 1-9: 0
3. TROUBLE FALLING OR STAYING ASLEEP: NOT AT ALL

## 2024-05-23 NOTE — PATIENT INSTRUCTIONS
list of the medicines you take.  How can you care for yourself at home?  Diet    Use less salt when you cook and eat. This helps lower your blood pressure. Taste food before salting. Add only a little salt when you think you need it. With time, your taste buds will adjust to less salt.     Eat fewer snack items, fast foods, canned soups, and other high-salt, high-fat, processed foods.     Read food labels and try to avoid saturated and trans fats. They increase your risk of heart disease by raising cholesterol levels.     Limit the amount of solid fat--butter, margarine, and shortening--you eat. Use olive, peanut, or canola oil when you cook. Bake, broil, and steam foods instead of frying them.     Eat a variety of fruit and vegetables every day. Dark green, deep orange, red, or yellow fruits and vegetables are especially good for you. Examples include spinach, carrots, peaches, and berries.     Foods high in fiber can reduce your cholesterol and provide important vitamins and minerals. High-fiber foods include whole-grain cereals and breads, oatmeal, beans, brown rice, citrus fruits, and apples.     Eat lean proteins. Heart-healthy proteins include seafood, lean meats and poultry, eggs, beans, peas, nuts, seeds, and soy products.     Limit drinks and foods with added sugar. These include candy, desserts, and soda pop.   Heart-healthy lifestyle    If your doctor recommends it, get more exercise. For many people, walking is a good choice. Or you may want to swim, bike, or do other activities. Bit by bit, increase the time you're active every day. Try for at least 30 minutes on most days of the week.     Try to quit or cut back on using tobacco and other nicotine products. This includes smoking and vaping. If you need help quitting, talk to your doctor about stop-smoking programs and medicines. These can increase your chances of quitting for good. Quitting is one of the most important things you can do to protect your

## 2024-05-23 NOTE — PROGRESS NOTES
Medicare Annual Wellness Visit    Miriam Jaime is here for Medicare AWV (Fasting.  No concerns today. )    Assessment & Plan   Medicare annual wellness visit, subsequent  -Declines RSV and shingles vaccine  Overweight (BMI 25.0-29.9)  -Strong family history of heart disease, updating lipid panel, CMP, CBC  Bipolar affective disorder, manic, severe, with psychotic behavior (HCC)  Assessment & Plan:   Stable.  On Medicare after long-term disability in 2018.  Continue to follow regularly with psychiatry.  Recommendations for Preventive Services Due: see orders and patient instructions/AVS.  Recommended screening schedule for the next 5-10 years is provided to the patient in written form: see Patient Instructions/AVS.     No follow-ups on file.     Subjective   The following acute and/or chronic problems were also addressed today:    Well Woman Exam:  Last Pap Smear: follows with [facility]  Last Mammogram: just done earlier this month  Last Colonoscopy: working to schedule for later this year  Personal Cancer Hx: None  Family Cancer Hx: None  LMP: Postmenopausal  OB Gyn Care: [facility]  Contraception: None  TDAP: due  HIV: utd  Hep C: utd       Acute Concerns Today:    Updated Blood Work:  Strong family history of heart disease      Patient's complete Health Risk Assessment and screening values have been reviewed and are found in Flowsheets. The following problems were reviewed today and where indicated follow up appointments were made and/or referrals ordered.    Positive Risk Factor Screenings with Interventions:                   Vision Screen:  Do you have difficulty driving, watching TV, or doing any of your daily activities because of your eyesight?: No  Have you had an eye exam within the past year?: (!) No  No results found.    Interventions:   Declines additional resources, will schedule with her eye doctor                    Objective   Vitals:    05/23/24 0838   BP: 108/69   Site: Right Upper Arm   Position: Sitting

## 2024-05-23 NOTE — PROGRESS NOTES
I reviewed with the resident the medical history and the resident's findings on the physical examination.  I discussed with the resident the patient's diagnosis and concur with the plan.     Karla Barragan MD 5/23/2024

## 2024-05-23 NOTE — ASSESSMENT & PLAN NOTE
Stable.  On Medicare after long-term disability in 2018.  Continue to follow regularly with psychiatry.

## 2024-05-23 NOTE — PROGRESS NOTES
Call placed to pt and Daughter in Law Luther.  Appt scheduled for in home visit with INR check on Friday 2/17/2023 at 9 am.    Pt and family instructed on low vitamin D level reviewed by MARY KATE Pringle,  Plan to start Vitamin D 5,000 units daily.  Family verbalized understanding and will  this week.    Identified pt with two pt identifiers(name and ). Reviewed record in preparation for visit and have obtained necessary documentation.  Chief Complaint   Patient presents with    Medicare AWV     Fasting.  No concerns today.         Health Maintenance Due   Topic    Breast cancer screen     Shingles vaccine (1 of 2)    Cervical cancer screen     Respiratory Syncytial Virus (RSV) Pregnant or age 60 yrs+ (1 - 1-dose 60+ series)    COVID-19 Vaccine ( season)    Annual Wellness Visit (Medicare Advantage)     Depression Monitoring        There were no vitals filed for this visit.      \"Have you been to the ER, urgent care clinic since your last visit?  Hospitalized since your last visit?\"    NO    “Have you seen or consulted any other health care providers outside of [facility] since your last visit?”    Yes, Psychiatry Dr. [address]    Have you had a mammogram?”   YES [facility] 24 Nurse/CMA to request most recent records if not in the chart    No breast cancer screening on file      “Have you had a pap smear?”    YES Scheduled for 2024 with [facility]  Nurse/CMA to request most recent records if not in the chart    Date of last Cervical Cancer screen (HPV or PAP): 2016             Click Here for Release of Records Request     This patient is accompanied in the office by her self.  I have received verbal consent from Miriam Jaime to discuss any/all medical information while they are present in the room.

## 2024-06-18 DIAGNOSIS — Z12.31 ENCOUNTER FOR SCREENING MAMMOGRAM FOR MALIGNANT NEOPLASM OF BREAST: ICD-10-CM

## 2024-11-05 ENCOUNTER — ANESTHESIA EVENT (OUTPATIENT)
Facility: HOSPITAL | Age: 59
End: 2024-11-05
Payer: MEDICARE

## 2024-11-05 ENCOUNTER — HOSPITAL ENCOUNTER (OUTPATIENT)
Facility: HOSPITAL | Age: 59
Setting detail: OUTPATIENT SURGERY
Discharge: HOME | End: 2024-11-05
Attending: SPECIALIST | Admitting: SPECIALIST
Payer: MEDICARE

## 2024-11-05 ENCOUNTER — ANESTHESIA (OUTPATIENT)
Facility: HOSPITAL | Age: 59
End: 2024-11-05
Payer: MEDICARE

## 2024-11-05 VITALS
RESPIRATION RATE: 21 BRPM | SYSTOLIC BLOOD PRESSURE: 134 MMHG | DIASTOLIC BLOOD PRESSURE: 68 MMHG | WEIGHT: 156.09 LBS | HEIGHT: 64 IN | BODY MASS INDEX: 26.65 KG/M2 | HEART RATE: 60 BPM | TEMPERATURE: 97.9 F | OXYGEN SATURATION: 100 %

## 2024-11-05 PROCEDURE — 2709999900 HC NON-CHARGEABLE SUPPLY: Performed by: SPECIALIST

## 2024-11-05 PROCEDURE — 3600007502: Performed by: SPECIALIST

## 2024-11-05 PROCEDURE — 6360000002 HC RX W HCPCS: Performed by: NURSE ANESTHETIST, CERTIFIED REGISTERED

## 2024-11-05 PROCEDURE — 7100000011 HC PHASE II RECOVERY - ADDTL 15 MIN: Performed by: SPECIALIST

## 2024-11-05 PROCEDURE — 3600007512: Performed by: SPECIALIST

## 2024-11-05 PROCEDURE — 7100000010 HC PHASE II RECOVERY - FIRST 15 MIN: Performed by: SPECIALIST

## 2024-11-05 PROCEDURE — 3700000000 HC ANESTHESIA ATTENDED CARE: Performed by: SPECIALIST

## 2024-11-05 PROCEDURE — 3700000001 HC ADD 15 MINUTES (ANESTHESIA): Performed by: SPECIALIST

## 2024-11-05 PROCEDURE — 88305 TISSUE EXAM BY PATHOLOGIST: CPT

## 2024-11-05 PROCEDURE — 2500000003 HC RX 250 WO HCPCS: Performed by: NURSE ANESTHETIST, CERTIFIED REGISTERED

## 2024-11-05 RX ORDER — LIDOCAINE HYDROCHLORIDE 20 MG/ML
INJECTION, SOLUTION EPIDURAL; INFILTRATION; INTRACAUDAL; PERINEURAL
Status: DISCONTINUED | OUTPATIENT
Start: 2024-11-05 | End: 2024-11-05 | Stop reason: SDUPTHER

## 2024-11-05 RX ORDER — SODIUM CHLORIDE 9 MG/ML
INJECTION, SOLUTION INTRAVENOUS CONTINUOUS
Status: DISCONTINUED | OUTPATIENT
Start: 2024-11-05 | End: 2024-11-05 | Stop reason: HOSPADM

## 2024-11-05 RX ORDER — SODIUM CHLORIDE 0.9 % (FLUSH) 0.9 %
5-40 SYRINGE (ML) INJECTION PRN
Status: DISCONTINUED | OUTPATIENT
Start: 2024-11-05 | End: 2024-11-05 | Stop reason: HOSPADM

## 2024-11-05 RX ORDER — PROPOFOL 10 MG/ML
INJECTION, EMULSION INTRAVENOUS
Status: DISCONTINUED | OUTPATIENT
Start: 2024-11-05 | End: 2024-11-05 | Stop reason: SDUPTHER

## 2024-11-05 RX ORDER — SIMETHICONE 40MG/0.6ML
40 SUSPENSION, DROPS(FINAL DOSAGE FORM)(ML) ORAL AS NEEDED
Status: DISCONTINUED | OUTPATIENT
Start: 2024-11-05 | End: 2024-11-05 | Stop reason: HOSPADM

## 2024-11-05 RX ADMIN — PROPOFOL 100 MG: 10 INJECTION, EMULSION INTRAVENOUS at 09:14

## 2024-11-05 RX ADMIN — LIDOCAINE HYDROCHLORIDE 60 MG: 20 INJECTION, SOLUTION EPIDURAL; INFILTRATION; INTRACAUDAL at 09:12

## 2024-11-05 RX ADMIN — PROPOFOL 200 MCG/KG/MIN: 10 INJECTION, EMULSION INTRAVENOUS at 09:15

## 2024-11-05 ASSESSMENT — PAIN - FUNCTIONAL ASSESSMENT: PAIN_FUNCTIONAL_ASSESSMENT: 0-10

## 2024-11-05 NOTE — OP NOTE
[facility]  [facility]  Emile Malhotra MD  [phone]      2024    Colonoscopy Procedure Note  Miriam Jaime  :  1965  [facility] Medical Record Number: [id number]    Indications:     Screening colonoscopy  PCP:  Karla Barragan MD  Anesthesia/Sedation: Conscious Sedation/Moderate Sedation/GETA, see notes  Endoscopist:  Dr. Emile Malhotra  Complications:  None  Estimated Blood Loss:  None    Permit:  The indications, risks, benefits and alternatives were reviewed with the patient or their decision maker who was provided an opportunity to ask questions and all questions were answered.  The specific risks of colonoscopy with conscious sedation were reviewed, including but not limited to anesthetic complication, bleeding, adverse drug reaction, missed lesion, infection, IV site reactions, and intestinal perforation which would lead to the need for surgical repair.  Alternatives to colonoscopy including radiographic imaging, observation without testing, or laboratory testing were reviewed including the limitations of those alternatives.  After considering the options and having all their questions answered, the patient or their decision maker provided both verbal and written consent to proceed.        Procedure in Detail:  After obtaining informed consent, positioning of the patient in the left lateral decubitus position, and conduction of a pre-procedure pause or \"time out\" the endoscope was introduced into the anus and advanced to the terminal ileum.  The quality of the colonic preparation was adequate.  A careful inspection was made as the colonoscope was withdrawn, findings and interventions are described below.    Findings:   10mm sessile polyp in the ascending removed with cold snare, retrieved, and hemostasis confirmed.  Diffuse melanosis noted.  Terminal ileum normal.    Specimens:    See

## 2024-11-05 NOTE — H&P
59 y.o. female for open access colonoscopy for screening   Additional data for completion of the targeted pre-endoscopy H&P will be provided under 'H&P interval notes'.  Please see that document which will be attached to this.  Emile Malhotra MD    Last 2014 negative Malhotra.  
alternatives, the patient requested that we proceed and indicated so on a written consent form.      Will proceed with procedure as planned.  Emile Malhotra MD

## 2024-11-05 NOTE — ANESTHESIA PRE PROCEDURE
of last liquid consumption: 0500                        Time of last solid consumption: 1900                        Date of last liquid consumption: 11/05/24                        Date of last solid food consumption: 11/03/24    BMI:   Wt Readings from Last 3 Encounters:   11/05/24 70.8 kg (156 lb 1.4 oz)   05/23/24 67.6 kg (149 lb)   05/17/23 70.5 kg (155 lb 6.4 oz)     Body mass index is 26.79 kg/m².    CBC:   Lab Results   Component Value Date/Time    WBC 3.9 05/23/2024 09:25 AM    RBC 4.02 05/23/2024 09:25 AM    HGB 11.8 05/23/2024 09:25 AM    HCT 35.9 05/23/2024 09:25 AM    MCV 89.3 05/23/2024 09:25 AM    RDW 12.7 05/23/2024 09:25 AM     05/23/2024 09:25 AM       CMP:   Lab Results   Component Value Date/Time     05/23/2024 09:25 AM    K 4.0 05/23/2024 09:25 AM     05/23/2024 09:25 AM    CO2 30 05/23/2024 09:25 AM    BUN 9 05/23/2024 09:25 AM    CREATININE 0.72 05/23/2024 09:25 AM    GFRAA >60 08/31/2022 09:50 AM    LABGLOM >90 05/23/2024 09:25 AM    LABGLOM >60 05/17/2023 10:50 AM    LABGLOM >60 10/25/2022 10:37 AM    GLUCOSE 92 05/23/2024 09:25 AM    CALCIUM 9.3 05/23/2024 09:25 AM    BILITOT 0.5 05/23/2024 09:25 AM    ALKPHOS 82 05/23/2024 09:25 AM    AST 16 05/23/2024 09:25 AM    ALT 19 05/23/2024 09:25 AM       POC Tests: No results for input(s): \"POCGLU\", \"POCNA\", \"POCK\", \"POCCL\", \"POCBUN\", \"POCHEMO\", \"POCHCT\" in the last 72 hours.    Coags:   Lab Results   Component Value Date/Time    PROTIME 10.3 10/25/2022 10:37 AM    INR 1.0 10/25/2022 10:37 AM       HCG (If Applicable): No results found for: \"PREGTESTUR\", \"PREGSERUM\", \"HCG\", \"HCGQUANT\"     ABGs: No results found for: \"PHART\", \"PO2ART\", \"CYA9TZC\", \"SSZ7WIR\", \"BEART\", \"T9CDNQKT\"     Type & Screen (If Applicable):  Lab Results   Component Value Date    ABORH A POSITIVE 10/25/2022    LABANTI NEG 10/25/2022       Drug/Infectious Status (If Applicable):  Lab Results   Component Value Date/Time    HEPCAB <0.1 02/01/2016 08:56 AM

## 2024-11-05 NOTE — ANESTHESIA POSTPROCEDURE EVALUATION
Department of Anesthesiology  Postprocedure Note    Patient: Miriam Jaime  MRN: [id number]  YOB: 1965  Date of evaluation: 11/5/2024    Procedure Summary       Date: 11/05/24 Room / Location: [place] [address] / [facility] ENDOSCOPY    Anesthesia Start: 0910 Anesthesia Stop: 0956    Procedures:       COLONOSCOPY (Lower GI Region)      COLONOSCOPY POLYPECTOMY SNARE/BIOPSY (Lower GI Region) Diagnosis:       Encounter for screening colonoscopy      (Encounter for screening colonoscopy [Z12.11])    Surgeons: Emile Malhotra MD Responsible Provider: Abdoulaye Ralph MD    Anesthesia Type: MAC ASA Status: 2            Anesthesia Type: MAC    Felicia Phase I: Felicia Score: 10    Felicia Phase II: Felicia Score: 7    Anesthesia Post Evaluation    Patient location during evaluation: PACU  Patient participation: complete - patient participated  Level of consciousness: awake and alert  Pain score: 0  Airway patency: patent  Nausea & Vomiting: no vomiting and no nausea  Cardiovascular status: hemodynamically stable  Respiratory status: room air  Hydration status: stable  There was medical reason for not using a multimodal analgesia pain management approach.    No notable events documented.

## 2024-11-05 NOTE — PERIOP NOTE
Received recovery report from anesthesia team, see anesthesia note. Abdomen remains soft and non-tender post-procedure. Pt has no complaints at this time and tolerated procedure well. Endoscope was pre-cleaned at the bedside by Ravi Vicente immediately following procedure. Post recovery report given to Cecelia Stroud RN.

## 2024-11-05 NOTE — DISCHARGE INSTRUCTIONS
[facility]  [facility]  Emile Rowe MD  [phone]      November 5, 2024    Miriam Jaime  YOB: 1965    COLONOSCOPY DISCHARGE INSTRUCTIONS    If there is redness at IV site you should apply warm compress to area.  If redness or soreness persist contact Dr. Rowe' or your primary care doctor.    There may be a slight amount of blood passed from the rectum.  Gaseous discomfort may develop, but walking, belching will help relieve this.  You may not operate a vehicle for 12 hours  You may not operate machinery or dangerous appliances for rest of today  You may not drink alcoholic beverages for 12 hours  Avoid making any critical decisions for 24 hours    DIET:  You may resume your normal diet, but some patients find that heavy or large meals may lead to indigestion or vomiting.  I suggest a light meal as first food intake.    MEDICATIONS:  The use of some over-the-counter pain medication may lead to bleeding after colon biopsies or polyp removal.  Tylenol (also called acetaminophen) is safe to take even if you have had colonoscopy with polyp removal.  Based on the procedure you had today you may not safely take aspirin or aspirin-like products for the next ten (10) days.  Remember that Tylenol (also called acetaminophen) is safe to take after colonoscopy even if you have had biopsies or polyps removed.    ACTIVITY:  You may resume your normal household activities, but it is recommended that you spend the remainder of the day resting -  avoid any strenuous activity.    CALL DR. ROWE' OFFICE IF:  Increasing pain, nausea, vomiting  Abdominal distension (swelling)  Significant new or increased bleeding (oral or rectal)  Fever/Chills  Chest pain/shortness of breath                       Additional instructions:   Great news, no colon cancer but we did find one colon polyp today, we removed that and will have that examined under

## 2025-06-30 ENCOUNTER — OFFICE VISIT (OUTPATIENT)
Age: 60
End: 2025-06-30
Payer: MEDICARE

## 2025-06-30 VITALS
HEART RATE: 68 BPM | RESPIRATION RATE: 17 BRPM | WEIGHT: 159 LBS | OXYGEN SATURATION: 95 % | TEMPERATURE: 98.7 F | DIASTOLIC BLOOD PRESSURE: 85 MMHG | SYSTOLIC BLOOD PRESSURE: 136 MMHG | HEIGHT: 64 IN | BODY MASS INDEX: 27.14 KG/M2

## 2025-06-30 DIAGNOSIS — Z71.89 ADVANCE CARE PLANNING: ICD-10-CM

## 2025-06-30 DIAGNOSIS — E66.3 OVERWEIGHT (BMI 25.0-29.9): ICD-10-CM

## 2025-06-30 DIAGNOSIS — Z00.00 MEDICARE ANNUAL WELLNESS VISIT, SUBSEQUENT: Primary | ICD-10-CM

## 2025-06-30 DIAGNOSIS — D72.818 OTHER DECREASED WHITE BLOOD CELL (WBC) COUNT: ICD-10-CM

## 2025-06-30 DIAGNOSIS — Z13.1 SCREENING FOR DIABETES MELLITUS: ICD-10-CM

## 2025-06-30 DIAGNOSIS — R00.2 PALPITATIONS: ICD-10-CM

## 2025-06-30 PROCEDURE — 99213 OFFICE O/P EST LOW 20 MIN: CPT | Performed by: FAMILY MEDICINE

## 2025-06-30 PROCEDURE — G0439 PPPS, SUBSEQ VISIT: HCPCS | Performed by: FAMILY MEDICINE

## 2025-06-30 PROCEDURE — 99214 OFFICE O/P EST MOD 30 MIN: CPT | Performed by: FAMILY MEDICINE

## 2025-06-30 ASSESSMENT — LIFESTYLE VARIABLES
HOW MANY STANDARD DRINKS CONTAINING ALCOHOL DO YOU HAVE ON A TYPICAL DAY: 1 OR 2
HOW OFTEN DO YOU HAVE A DRINK CONTAINING ALCOHOL: 2-3 TIMES A WEEK

## 2025-06-30 ASSESSMENT — PATIENT HEALTH QUESTIONNAIRE - PHQ9
1. LITTLE INTEREST OR PLEASURE IN DOING THINGS: NOT AT ALL
SUM OF ALL RESPONSES TO PHQ QUESTIONS 1-9: 0
2. FEELING DOWN, DEPRESSED OR HOPELESS: NOT AT ALL

## 2025-06-30 NOTE — PROGRESS NOTES
Miriam Jaime is a 60 y.o. female      Chief Complaint   Patient presents with    Medicare AWV     - high HR during the night        \"Have you been to the ER, urgent care clinic since your last visit?  Hospitalized since your last visit?\"    NO    “Have you seen or consulted any other health care providers outside of [facility] since your last visit?”    NO        “Have you had a pap smear?”    NO    Date of last Cervical Cancer screen (HPV or PAP): 2/1/2016            Vitals:    06/30/25 1030   BP: 136/85   BP Site: Left Upper Arm   Patient Position: Sitting   BP Cuff Size: Medium Adult   Pulse: 68   Resp: 17   Temp: 98.7 °F (37.1 °C)   TempSrc: Temporal   SpO2: 95%   Weight: 72.1 kg (159 lb)   Height: 1.626 m (5' 4\")            Health Maintenance Due   Topic Date Due    Shingles vaccine (1 of 2) Never done    Pneumococcal 50+ years Vaccine (1 of 1 - PCV) Never done    Cervical cancer screen  02/01/2021    COVID-19 Vaccine (4 - 2024-25 season) 09/01/2024    Annual Wellness Visit (Medicare Advantage)  01/01/2025    Depression Monitoring  05/23/2025         Medication Reconciliation completed, changes noted.  Please  Update medication list.

## 2025-06-30 NOTE — PATIENT INSTRUCTIONS
is never too late to quit. Try to avoid secondhand smoke too.     Stay at a weight that's healthy for you. Talk to your doctor if you need help losing weight.     Try to get 7 to 9 hours of sleep each night.     Limit alcohol to 2 drinks a day for men and 1 drink a day for women. Too much alcohol can cause health problems.     Manage other health problems such as diabetes, high blood pressure, and high cholesterol. If you think you may have a problem with alcohol or drug use, talk to your doctor.   Medicines    Take your medicines exactly as prescribed. Call your doctor if you think you are having a problem with your medicine.     If your doctor recommends aspirin, take the amount directed each day. Make sure you take aspirin and not another kind of pain reliever, such as acetaminophen (Tylenol).   When should you call for help?   Call 911 if you have symptoms of a heart attack. These may include:    Chest pain or pressure, or a strange feeling in the chest.     Sweating.     Shortness of breath.     Pain, pressure, or a strange feeling in the back, neck, jaw, or upper belly or in one or both shoulders or arms.     Lightheadedness or sudden weakness.     A fast or irregular heartbeat.   After you call 911, the  may tell you to chew 1 adult-strength or 2 to 4 low-dose aspirin. Wait for an ambulance. Do not try to drive yourself.  Watch closely for changes in your health, and be sure to contact your doctor if you have any problems.  Where can you learn more?  Go to https://www.[employer]/patientEd and enter F075 to learn more about \"A Healthy Heart: Care Instructions.\"  Current as of: July 31, 2024  Content Version: 14.5  © 0928-8385 [employer].   Care instructions adapted under license by [employer]. If you have questions about a medical condition or this instruction, always ask your healthcare professional. [employer], disclaims any warranty or liability for your use of this

## 2025-06-30 NOTE — PROGRESS NOTES
Medicare Annual Wellness Visit    Miriam Jaime is here for Medicare AWV (- high HR during the night )    Assessment & Plan  Elevated heart rate  Reports elevated heart rates at night, with spikes up to 120 bpm. Occurs while on Seroquel, which can cause QTc prolongation.  Work up: Perform EKG today to check for QT interval prolongation - normal on exam at 416. Order Holter monitor for 48 hours to capture nocturnal episodes. Coordinate with psychiatrist if EKG shows QTc prolongation. Referral placed to Cardiology for consult given family hx of heart disease and pt concerns.     Health maintenance  Due for blood work: electrolytes, blood counts, A1c, blood sugars, thyroid function tests. Consider shingles and pneumonia vaccines at pharmacy. Recommended eye doctor visit. Last Pap smear normal, up to date on mammograms.    Chronic constipation  Reports chronic constipation since starting Seroquel. Takes herbal laxative. Colonoscopy this year found one polyp; next colonoscopy in 5 years.    Follow-up: Next scheduled visit.  Medicare annual wellness visit, subsequent  Palpitations  -     AMB POC EKG ROUTINE W/ 12 LEADS, SCREEN (-INITIAL PREV EXAM)  -     TSH; Future  -     Cardiac holter monitor (1 day-2 day); Future  -     BS - Sandy Pink MD, Cardiology, [place]  -     ESTABLISHED, MOD MDM, 30-39 MIN [[id number]]  Other decreased white blood cell (WBC) count  -     CBC; Future  Overweight (BMI 25.0-29.9)  -     Comprehensive Metabolic Panel; Future  -     Hemoglobin A1C; Future  -     Lipid Panel; Future  Screening for diabetes mellitus  -     Comprehensive Metabolic Panel; Future  -     Hemoglobin A1C; Future  Advance care planning  -     FULL CODE    Results       Return in 1 year (on 6/30/2026) for Medicare AWV.     Subjective     History of Present Illness  The patient presents for a Medicare wellness visit.    Heart Rate Monitoring  - She has been monitoring her heart rate using a smartwatch, which has  Current every day smoker

## 2025-07-02 ENCOUNTER — RESULTS FOLLOW-UP (OUTPATIENT)
Age: 60
End: 2025-07-02

## 2025-07-02 LAB
ALBUMIN SERPL-MCNC: 4.3 G/DL (ref 3.5–5)
ALBUMIN/GLOB SERPL: 1.4 (ref 1.1–2.2)
ALP SERPL-CCNC: 88 U/L (ref 45–117)
ALT SERPL-CCNC: 24 U/L (ref 12–78)
ANION GAP SERPL CALC-SCNC: 5 MMOL/L (ref 2–12)
AST SERPL-CCNC: 14 U/L (ref 15–37)
BILIRUB SERPL-MCNC: 0.6 MG/DL (ref 0.2–1)
BUN SERPL-MCNC: 10 MG/DL (ref 6–20)
BUN/CREAT SERPL: 14 (ref 12–20)
CALCIUM SERPL-MCNC: 9.3 MG/DL (ref 8.5–10.1)
CHLORIDE SERPL-SCNC: 104 MMOL/L (ref 97–108)
CHOLEST SERPL-MCNC: 223 MG/DL
CO2 SERPL-SCNC: 29 MMOL/L (ref 21–32)
CREAT SERPL-MCNC: 0.69 MG/DL (ref 0.55–1.02)
ERYTHROCYTE [DISTWIDTH] IN BLOOD BY AUTOMATED COUNT: 12.7 % (ref 11.5–14.5)
EST. AVERAGE GLUCOSE BLD GHB EST-MCNC: 103 MG/DL
GLOBULIN SER CALC-MCNC: 3.1 G/DL (ref 2–4)
GLUCOSE SERPL-MCNC: 87 MG/DL (ref 65–100)
HBA1C MFR BLD: 5.2 % (ref 4–5.6)
HCT VFR BLD AUTO: 39.5 % (ref 35–47)
HDLC SERPL-MCNC: 72 MG/DL
HDLC SERPL: 3.1 (ref 0–5)
HGB BLD-MCNC: 12.9 G/DL (ref 11.5–16)
LDLC SERPL CALC-MCNC: 126.8 MG/DL (ref 0–100)
MCH RBC QN AUTO: 29.6 PG (ref 26–34)
MCHC RBC AUTO-ENTMCNC: 32.7 G/DL (ref 30–36.5)
MCV RBC AUTO: 90.6 FL (ref 80–99)
NRBC # BLD: 0 K/UL (ref 0–0.01)
NRBC BLD-RTO: 0 PER 100 WBC
PLATELET # BLD AUTO: 309 K/UL (ref 150–400)
PMV BLD AUTO: 9.2 FL (ref 8.9–12.9)
POTASSIUM SERPL-SCNC: 3.9 MMOL/L (ref 3.5–5.1)
PROT SERPL-MCNC: 7.4 G/DL (ref 6.4–8.2)
RBC # BLD AUTO: 4.36 M/UL (ref 3.8–5.2)
SODIUM SERPL-SCNC: 138 MMOL/L (ref 136–145)
TRIGL SERPL-MCNC: 121 MG/DL
TSH SERPL DL<=0.05 MIU/L-ACNC: 0.87 UIU/ML (ref 0.36–3.74)
VLDLC SERPL CALC-MCNC: 24.2 MG/DL
WBC # BLD AUTO: 5.2 K/UL (ref 3.6–11)

## 2025-07-14 ENCOUNTER — HOSPITAL ENCOUNTER (OUTPATIENT)
Facility: HOSPITAL | Age: 60
Discharge: HOME | End: 2025-07-16
Attending: FAMILY MEDICINE
Payer: MEDICARE

## 2025-07-14 DIAGNOSIS — R00.2 PALPITATIONS: ICD-10-CM

## 2025-07-14 PROCEDURE — 93225 XTRNL ECG REC<48 HRS REC: CPT

## 2025-07-21 ENCOUNTER — TELEPHONE (OUTPATIENT)
Age: 60
End: 2025-07-21

## 2025-07-21 NOTE — TELEPHONE ENCOUNTER
Patient confused as her MyChart indicated Dr. Barragan' had spoken with her today when she has not. This PSR informed patient that the doctor had responded to her earlier inquiry however, it did not appear the response had been forwarded to the patient. This PSR read Dr. Barragan' response then sent the text to the patient's MyChart. Patient now understands, will wait patiently for the results to be collected/processed.

## 2025-07-21 NOTE — TELEPHONE ENCOUNTER
Pt is requesting a returned phone call to be informed of how and when she will received results of holter monitor reading. Her contact number is [phone].    Thank you

## (undated) DEVICE — TOTAL TRAY, 16FR 10ML SIL FOLEY, URN: Brand: MEDLINE

## (undated) DEVICE — Device

## (undated) DEVICE — 4-PORT MANIFOLD: Brand: NEPTUNE 2

## (undated) DEVICE — SUTURE STRATAFIX SYMMETRIC PDS + SZ 1 L18IN ABSRB VLT L48MM SXPP1A400

## (undated) DEVICE — SUTURE MCRYL SZ 3-0 L27IN ABSRB UD L19MM PS-2 3/8 CIR PRIM Y427H

## (undated) DEVICE — PREP SKN CHLRAPRP APL 26ML STR --

## (undated) DEVICE — SOLUTION SURG PREP 26 CC PURPREP

## (undated) DEVICE — SYR 20ML LL STRL LF --

## (undated) DEVICE — BLADE SAW W0.49XL3.15IN THK0.047IN CUT THK0.047IN REPL SAG

## (undated) DEVICE — SPONGE LAP 18X18IN STRL -- 5/PK

## (undated) DEVICE — SOLUTION IRRIG 3000ML 0.9% SOD CHL USP UROMATIC PLAS CONT

## (undated) DEVICE — BLADE SAW W098XL354IN THK0047IN CUT THK0047IN SAG

## (undated) DEVICE — GLOVE SURG SZ 8 L12IN FNGR THK94MIL STD WHT LTX FREE

## (undated) DEVICE — GLOVE SURG SZ 65 L12IN FNGR THK94MIL STD WHT LTX FREE

## (undated) DEVICE — SUTURE VCRL SZ 2-0 L36IN ABSRB UD L40MM CT 1/2 CIR J957H

## (undated) DEVICE — HYPODERMIC SAFETY NEEDLE: Brand: MAGELLAN

## (undated) DEVICE — DRAPE,EXTREMITY,89X128,STERILE: Brand: MEDLINE

## (undated) DEVICE — PADDING CAST SPEC 6INX4YD COT --

## (undated) DEVICE — T4 HOOD

## (undated) DEVICE — BLADE SAW OSCILLATING 85X19X2 MM ROBOTIC-ASSISTED VELYS

## (undated) DEVICE — 2108 SERIES SAGITTAL BLADE AGGRESSIVE  (25.0 X 1.19 X 85.0MM)

## (undated) DEVICE — GOWN,SIRUS,NONRNF,SETINSLV,2XL,18/CS: Brand: MEDLINE

## (undated) DEVICE — DRAPE EQUIP ROBOT STRL VELYS 20/PK ORDER IN MULTIIPLES OF 20 EACH

## (undated) DEVICE — TRANSFER SET 3": Brand: MEDLINE INDUSTRIES, INC.

## (undated) DEVICE — 3M™ CUROS™ DISINFECTING CAP FOR NEEDLELESS CONNECTORS 270/CARTON 20 CARTONS/CASE CFF1-270: Brand: CUROS™

## (undated) DEVICE — SOLUTION IRRIG 1000ML 0.9% SOD CHL USP POUR PLAS BTL

## (undated) DEVICE — SCRUB DRY SURG EZ SCRUB BRUSH PREOPERATIVE GRN

## (undated) DEVICE — DRESSING HYDROCOLLOID BORDER 35X10 IN ALUM PRIMASEAL

## (undated) DEVICE — TOTAL JOINT - SMH: Brand: MEDLINE INDUSTRIES, INC.

## (undated) DEVICE — SPONGE GZ W4XL4IN COT 12 PLY TYP VII WVN C FLD DSGN

## (undated) DEVICE — TUBING SUCT 12FR MAL ALUM SHFT FN CAP VENT UNIV CONN W/ OBT

## (undated) DEVICE — GLOVE SURG SZ 65 L12IN FNGR THK79MIL GRN LTX FREE

## (undated) DEVICE — CONTAINER,SPECIMEN,4OZ,OR STRL: Brand: MEDLINE

## (undated) DEVICE — SOLUTION IRRIG 1000ML STRL H2O USP PLAS POUR BTL

## (undated) DEVICE — SUTURE VCRL 1 L27IN ABSRB CT BRAID COAT UD J281H

## (undated) DEVICE — BOWL BNE CEM MIX SPAT CURET SMARTMIX CTS

## (undated) DEVICE — PIN DRL 4X125 MM ROBOTIC-ASSISTED SOLUTION ARRY VELYS

## (undated) DEVICE — PENCIL SMK EVAC 10 FT BLADE ELECTRD ROCKER FOR TELSCP

## (undated) DEVICE — PADDING CST 6IN STERILE --

## (undated) DEVICE — GLOVE ORTHO 8   MSG9480

## (undated) DEVICE — DERMABOND SKIN ADH 0.7ML -- DERMABOND ADVANCED 12/BX

## (undated) DEVICE — VELYS ROBOTIC-ASSISTED SOLUTION ARRAY SET - KNEE: Brand: VELYS

## (undated) DEVICE — DRAPE EQUIP SATELLITE STN STRL VELYS

## (undated) DEVICE — BANDAGE COMPR M W6INXL10YD WHT BGE VELC E MTRX HK AND LOOP

## (undated) DEVICE — CONTAINER SPEC 20 ML LID NEUT BUFF FORMALIN 10 % POLYPR STS

## (undated) DEVICE — ZIMMER® STERILE DISPOSABLE TOURNIQUET CUFF WITH PLC, DUAL PORT, SINGLE BLADDER, 34 IN. (86 CM)

## (undated) DEVICE — VELYS ROBOT-ASSISTED SOLUTION ARRAY DRILL PIN 125 MM X 4 MM: Brand: VELYS

## (undated) DEVICE — 2108 SERIES SAGITTAL BLADE, NO OFFSET  (12.4 X 1.19 X 82.1MM)

## (undated) DEVICE — VELYS ROBOTIC-ASSISTED SOLUTION OSCILLATING SAW BLADE 85 MM X 19 MM X 2 MM: Brand: VELYS

## (undated) DEVICE — STAPLER SKIN H3.9MM WIRE DIA0.58MM CRWN 6.9MM 35 STPL ROT